# Patient Record
Sex: FEMALE | Race: AMERICAN INDIAN OR ALASKA NATIVE
[De-identification: names, ages, dates, MRNs, and addresses within clinical notes are randomized per-mention and may not be internally consistent; named-entity substitution may affect disease eponyms.]

---

## 2017-07-07 ENCOUNTER — HOSPITAL ENCOUNTER (INPATIENT)
Dept: HOSPITAL 31 - C.ER | Age: 57
LOS: 3 days | Discharge: HOME | DRG: 391 | End: 2017-07-10
Attending: INTERNAL MEDICINE | Admitting: INTERNAL MEDICINE
Payer: MEDICARE

## 2017-07-07 VITALS — BODY MASS INDEX: 25 KG/M2

## 2017-07-07 DIAGNOSIS — Z79.899: ICD-10-CM

## 2017-07-07 DIAGNOSIS — K21.9: ICD-10-CM

## 2017-07-07 DIAGNOSIS — F41.9: ICD-10-CM

## 2017-07-07 DIAGNOSIS — B20: ICD-10-CM

## 2017-07-07 DIAGNOSIS — Z99.2: ICD-10-CM

## 2017-07-07 DIAGNOSIS — E03.9: ICD-10-CM

## 2017-07-07 DIAGNOSIS — F03.90: ICD-10-CM

## 2017-07-07 DIAGNOSIS — Z87.891: ICD-10-CM

## 2017-07-07 DIAGNOSIS — D63.1: ICD-10-CM

## 2017-07-07 DIAGNOSIS — I50.22: ICD-10-CM

## 2017-07-07 DIAGNOSIS — I13.2: ICD-10-CM

## 2017-07-07 DIAGNOSIS — Z89.511: ICD-10-CM

## 2017-07-07 DIAGNOSIS — E11.22: ICD-10-CM

## 2017-07-07 DIAGNOSIS — E05.90: ICD-10-CM

## 2017-07-07 DIAGNOSIS — K30: Primary | ICD-10-CM

## 2017-07-07 DIAGNOSIS — N18.6: ICD-10-CM

## 2017-07-07 LAB
ALBUMIN SERPL-MCNC: 3.9 G/DL (ref 3.5–5)
ALBUMIN/GLOB SERPL: 0.7 {RATIO} (ref 1–2.1)
ALT SERPL-CCNC: 14 U/L (ref 9–52)
APTT BLD: 33 SECONDS (ref 21–34)
AST SERPL-CCNC: 22 U/L (ref 14–36)
BASOPHILS # BLD AUTO: 0 K/UL (ref 0–0.2)
BASOPHILS NFR BLD: 0.9 % (ref 0–2)
BUN SERPL-MCNC: 69 MG/DL (ref 7–17)
CALCIUM SERPL-MCNC: 6.7 MG/DL (ref 8.6–10.4)
EOSINOPHIL # BLD AUTO: 0 K/UL (ref 0–0.7)
EOSINOPHIL NFR BLD: 0.8 % (ref 0–4)
ERYTHROCYTE [DISTWIDTH] IN BLOOD BY AUTOMATED COUNT: 20 % (ref 11.5–14.5)
GFR NON-AFRICAN AMERICAN: 6
HGB BLD-MCNC: 11.6 G/DL (ref 11–16)
INR PPP: 0.9
LYMPHOCYTES # BLD AUTO: 1.2 K/UL (ref 1–4.3)
LYMPHOCYTES NFR BLD AUTO: 21.8 % (ref 20–40)
MCH RBC QN AUTO: 29.2 PG (ref 27–31)
MCHC RBC AUTO-ENTMCNC: 31.8 G/DL (ref 33–37)
MCV RBC AUTO: 91.8 FL (ref 81–99)
MONOCYTES # BLD: 0.6 K/UL (ref 0–0.8)
MONOCYTES NFR BLD: 9.8 % (ref 0–10)
NEUTROPHILS # BLD: 3.8 K/UL (ref 1.8–7)
NEUTROPHILS NFR BLD AUTO: 66.7 % (ref 50–75)
NRBC BLD AUTO-RTO: 0.1 % (ref 0–2)
PLATELET # BLD: 142 K/UL (ref 130–400)
PMV BLD AUTO: 8.8 FL (ref 7.2–11.7)
PROTHROMBIN TIME: 10.7 SECONDS (ref 9.7–12.2)
RBC # BLD AUTO: 3.98 MIL/UL (ref 3.8–5.2)
WBC # BLD AUTO: 5.6 K/UL (ref 4.8–10.8)

## 2017-07-07 RX ADMIN — FLUCONAZOLE SCH MLS/HR: 2 INJECTION, SOLUTION INTRAVENOUS at 22:11

## 2017-07-07 RX ADMIN — HUMAN INSULIN SCH: 100 INJECTION, SOLUTION SUBCUTANEOUS at 22:11

## 2017-07-07 RX ADMIN — SODIUM CHLORIDE SCH UNITS: 9 INJECTION, SOLUTION INTRAMUSCULAR; INTRAVENOUS; SUBCUTANEOUS at 21:25

## 2017-07-07 RX ADMIN — LOPINAVIR AND RITONAVIR SCH CAP: 200; 50 TABLET, FILM COATED ORAL at 22:09

## 2017-07-07 RX ADMIN — HUMAN INSULIN SCH: 100 INJECTION, SOLUTION SUBCUTANEOUS at 22:07

## 2017-07-07 RX ADMIN — NYSTATIN SCH ML: 100000 SUSPENSION ORAL at 22:08

## 2017-07-08 RX ADMIN — HUMAN INSULIN SCH: 100 INJECTION, SOLUTION SUBCUTANEOUS at 07:46

## 2017-07-08 RX ADMIN — HUMAN INSULIN SCH: 100 INJECTION, SOLUTION SUBCUTANEOUS at 17:59

## 2017-07-08 RX ADMIN — NYSTATIN SCH: 100000 SUSPENSION ORAL at 13:35

## 2017-07-08 RX ADMIN — LOPINAVIR AND RITONAVIR SCH: 200; 50 TABLET, FILM COATED ORAL at 10:45

## 2017-07-08 RX ADMIN — NYSTATIN SCH: 100000 SUSPENSION ORAL at 10:45

## 2017-07-08 RX ADMIN — EMTRICITABINE AND TENOFOVIR DISOPROXIL FUMARATE SCH: 200; 300 TABLET, FILM COATED ORAL at 10:46

## 2017-07-08 RX ADMIN — MAGNESIUM CITRATE SCH: 1.75 LIQUID ORAL at 10:45

## 2017-07-08 RX ADMIN — BRIMONIDINE TARTRATE SCH DROP: 2 SOLUTION/ DROPS OPHTHALMIC at 05:06

## 2017-07-08 RX ADMIN — FLUCONAZOLE SCH MLS/HR: 2 INJECTION, SOLUTION INTRAVENOUS at 17:49

## 2017-07-08 RX ADMIN — HUMAN INSULIN SCH: 100 INJECTION, SOLUTION SUBCUTANEOUS at 22:11

## 2017-07-08 RX ADMIN — HUMAN INSULIN SCH: 100 INJECTION, SOLUTION SUBCUTANEOUS at 12:06

## 2017-07-08 RX ADMIN — NYSTATIN SCH ML: 100000 SUSPENSION ORAL at 17:49

## 2017-07-08 RX ADMIN — LOPINAVIR AND RITONAVIR SCH CAP: 200; 50 TABLET, FILM COATED ORAL at 18:48

## 2017-07-08 RX ADMIN — BRIMONIDINE TARTRATE SCH DROP: 2 SOLUTION/ DROPS OPHTHALMIC at 17:47

## 2017-07-09 PROCEDURE — 5A1D00Z: ICD-10-PCS

## 2017-07-09 RX ADMIN — BRIMONIDINE TARTRATE SCH: 2 SOLUTION/ DROPS OPHTHALMIC at 18:34

## 2017-07-09 RX ADMIN — FLUCONAZOLE SCH MLS/HR: 2 INJECTION, SOLUTION INTRAVENOUS at 18:09

## 2017-07-09 RX ADMIN — NYSTATIN SCH ML: 100000 SUSPENSION ORAL at 18:11

## 2017-07-09 RX ADMIN — NYSTATIN SCH ML: 100000 SUSPENSION ORAL at 09:43

## 2017-07-09 RX ADMIN — NYSTATIN SCH ML: 100000 SUSPENSION ORAL at 13:02

## 2017-07-09 RX ADMIN — EMTRICITABINE AND TENOFOVIR DISOPROXIL FUMARATE SCH TAB: 200; 300 TABLET, FILM COATED ORAL at 09:44

## 2017-07-09 RX ADMIN — MAGNESIUM CITRATE SCH ML: 1.75 LIQUID ORAL at 09:43

## 2017-07-09 RX ADMIN — PANTOPRAZOLE SODIUM SCH MG: 40 TABLET, DELAYED RELEASE ORAL at 09:45

## 2017-07-09 RX ADMIN — BRIMONIDINE TARTRATE SCH DROP: 2 SOLUTION/ DROPS OPHTHALMIC at 18:27

## 2017-07-09 RX ADMIN — LOPINAVIR AND RITONAVIR SCH CAP: 200; 50 TABLET, FILM COATED ORAL at 09:43

## 2017-07-09 RX ADMIN — LOPINAVIR AND RITONAVIR SCH CAP: 200; 50 TABLET, FILM COATED ORAL at 18:10

## 2017-07-09 RX ADMIN — HUMAN INSULIN SCH: 100 INJECTION, SOLUTION SUBCUTANEOUS at 11:56

## 2017-07-09 RX ADMIN — HUMAN INSULIN SCH: 100 INJECTION, SOLUTION SUBCUTANEOUS at 21:26

## 2017-07-09 RX ADMIN — HUMAN INSULIN SCH: 100 INJECTION, SOLUTION SUBCUTANEOUS at 08:07

## 2017-07-09 RX ADMIN — BRIMONIDINE TARTRATE SCH DROP: 2 SOLUTION/ DROPS OPHTHALMIC at 05:11

## 2017-07-09 RX ADMIN — HUMAN INSULIN SCH: 100 INJECTION, SOLUTION SUBCUTANEOUS at 18:10

## 2017-07-10 VITALS
HEART RATE: 68 BPM | DIASTOLIC BLOOD PRESSURE: 76 MMHG | SYSTOLIC BLOOD PRESSURE: 121 MMHG | OXYGEN SATURATION: 97 % | TEMPERATURE: 97.5 F

## 2017-07-10 VITALS — RESPIRATION RATE: 20 BRPM

## 2017-07-10 LAB
ALBUMIN SERPL-MCNC: 3.5 G/DL (ref 3.5–5)
ALBUMIN/GLOB SERPL: 0.7 {RATIO} (ref 1–2.1)
ALT SERPL-CCNC: 21 U/L (ref 9–52)
AST SERPL-CCNC: 22 U/L (ref 14–36)
BASOPHILS # BLD AUTO: 0 K/UL (ref 0–0.2)
BASOPHILS NFR BLD: 1.4 % (ref 0–2)
BUN SERPL-MCNC: 67 MG/DL (ref 7–17)
CALCIUM SERPL-MCNC: 7.3 MG/DL (ref 8.6–10.4)
EOSINOPHIL # BLD AUTO: 0.1 K/UL (ref 0–0.7)
EOSINOPHIL NFR BLD: 2.4 % (ref 0–4)
ERYTHROCYTE [DISTWIDTH] IN BLOOD BY AUTOMATED COUNT: 18.9 % (ref 11.5–14.5)
GAMMA GLUTAMYL TRANSPEPTIDASE: 26 U/L (ref 8–78)
GFR NON-AFRICAN AMERICAN: 5
HEPATITIS A IGM: NEGATIVE
HEPATITIS B CORE AB: NEGATIVE
HEPATITIS B SURFACE AG: NEGATIVE
HEPATITIS C ANTIBODY: NEGATIVE
HGB BLD-MCNC: 11.1 G/DL (ref 11–16)
LYMPHOCYTES # BLD AUTO: 1.1 K/UL (ref 1–4.3)
LYMPHOCYTES NFR BLD AUTO: 33.5 % (ref 20–40)
MCH RBC QN AUTO: 29 PG (ref 27–31)
MCHC RBC AUTO-ENTMCNC: 31.7 G/DL (ref 33–37)
MCV RBC AUTO: 91.6 FL (ref 81–99)
MONOCYTES # BLD: 0.3 K/UL (ref 0–0.8)
MONOCYTES NFR BLD: 9.6 % (ref 0–10)
NEUTROPHILS # BLD: 1.8 K/UL (ref 1.8–7)
NEUTROPHILS NFR BLD AUTO: 53.1 % (ref 50–75)
NRBC BLD AUTO-RTO: 0.1 % (ref 0–2)
PLATELET # BLD: 159 K/UL (ref 130–400)
PMV BLD AUTO: 9.1 FL (ref 7.2–11.7)
RBC # BLD AUTO: 3.83 MIL/UL (ref 3.8–5.2)
WBC # BLD AUTO: 3.3 K/UL (ref 4.8–10.8)

## 2017-07-10 RX ADMIN — NYSTATIN SCH: 100000 SUSPENSION ORAL at 12:59

## 2017-07-10 RX ADMIN — BRIMONIDINE TARTRATE SCH DROP: 2 SOLUTION/ DROPS OPHTHALMIC at 05:32

## 2017-07-10 RX ADMIN — HUMAN INSULIN SCH: 100 INJECTION, SOLUTION SUBCUTANEOUS at 08:05

## 2017-07-10 RX ADMIN — NYSTATIN SCH: 100000 SUSPENSION ORAL at 09:16

## 2017-07-10 RX ADMIN — LOPINAVIR AND RITONAVIR SCH CAP: 200; 50 TABLET, FILM COATED ORAL at 19:09

## 2017-07-10 RX ADMIN — BRIMONIDINE TARTRATE SCH DROP: 2 SOLUTION/ DROPS OPHTHALMIC at 18:59

## 2017-07-10 RX ADMIN — NYSTATIN SCH ML: 100000 SUSPENSION ORAL at 19:00

## 2017-07-10 RX ADMIN — SODIUM CHLORIDE SCH UNITS: 9 INJECTION, SOLUTION INTRAMUSCULAR; INTRAVENOUS; SUBCUTANEOUS at 10:39

## 2017-07-10 RX ADMIN — FLUCONAZOLE SCH MLS/HR: 2 INJECTION, SOLUTION INTRAVENOUS at 19:00

## 2017-07-10 RX ADMIN — EMTRICITABINE AND TENOFOVIR DISOPROXIL FUMARATE SCH: 200; 300 TABLET, FILM COATED ORAL at 09:16

## 2017-07-10 RX ADMIN — MAGNESIUM CITRATE SCH: 1.75 LIQUID ORAL at 09:15

## 2017-07-10 RX ADMIN — PANTOPRAZOLE SODIUM SCH: 40 TABLET, DELAYED RELEASE ORAL at 09:16

## 2017-07-10 RX ADMIN — HUMAN INSULIN SCH: 100 INJECTION, SOLUTION SUBCUTANEOUS at 19:09

## 2017-07-10 RX ADMIN — LOPINAVIR AND RITONAVIR SCH: 200; 50 TABLET, FILM COATED ORAL at 09:16

## 2017-07-10 RX ADMIN — HUMAN INSULIN SCH: 100 INJECTION, SOLUTION SUBCUTANEOUS at 11:48

## 2017-07-12 LAB
% CD4 (T HELPER CELL): 16 PERCENT (ref 30–61)
% CD8 (SUPPRESSOR T CELL): 49 PERCENT (ref 12–42)
ABSOLUTE CD4 CELLS: 142 CELLS/MCL (ref 490–1740)
ABSOLUTE CD8 CELLS: 425 CELLS/MCL (ref 180–1170)
ABSOLUTE LYMPHOCYTES: 874 CELLS/MCL (ref 850–3900)
HELPER/SUPPRESSOR RATIO: 0.33 RATIO (ref 0.86–5)

## 2017-10-03 ENCOUNTER — HOSPITAL ENCOUNTER (EMERGENCY)
Dept: HOSPITAL 31 - C.ER | Age: 57
Discharge: HOME | End: 2017-10-03
Payer: MEDICARE

## 2017-10-03 VITALS — RESPIRATION RATE: 16 BRPM | SYSTOLIC BLOOD PRESSURE: 155 MMHG | HEART RATE: 84 BPM | DIASTOLIC BLOOD PRESSURE: 85 MMHG

## 2017-10-03 VITALS — TEMPERATURE: 98.2 F

## 2017-10-03 VITALS — BODY MASS INDEX: 25 KG/M2

## 2017-10-03 VITALS — OXYGEN SATURATION: 97 %

## 2017-10-03 DIAGNOSIS — E11.40: ICD-10-CM

## 2017-10-03 DIAGNOSIS — Z79.4: ICD-10-CM

## 2017-10-03 DIAGNOSIS — L89.629: Primary | ICD-10-CM

## 2017-10-31 ENCOUNTER — HOSPITAL ENCOUNTER (INPATIENT)
Dept: HOSPITAL 31 - C.ER | Age: 57
LOS: 9 days | Discharge: NURSING, REHAB OR LONG-TERM CARE | DRG: 969 | End: 2017-11-09
Attending: INTERNAL MEDICINE | Admitting: INTERNAL MEDICINE
Payer: MEDICARE

## 2017-10-31 VITALS — BODY MASS INDEX: 25 KG/M2

## 2017-10-31 DIAGNOSIS — L03.116: ICD-10-CM

## 2017-10-31 DIAGNOSIS — Z99.2: ICD-10-CM

## 2017-10-31 DIAGNOSIS — I70.244: ICD-10-CM

## 2017-10-31 DIAGNOSIS — D72.825: ICD-10-CM

## 2017-10-31 DIAGNOSIS — B20: Primary | ICD-10-CM

## 2017-10-31 DIAGNOSIS — D63.1: ICD-10-CM

## 2017-10-31 DIAGNOSIS — E03.9: ICD-10-CM

## 2017-10-31 DIAGNOSIS — Z89.511: ICD-10-CM

## 2017-10-31 DIAGNOSIS — E11.621: ICD-10-CM

## 2017-10-31 DIAGNOSIS — E11.22: ICD-10-CM

## 2017-10-31 DIAGNOSIS — E78.00: ICD-10-CM

## 2017-10-31 DIAGNOSIS — N18.6: ICD-10-CM

## 2017-10-31 DIAGNOSIS — F03.90: ICD-10-CM

## 2017-10-31 DIAGNOSIS — I50.9: ICD-10-CM

## 2017-10-31 DIAGNOSIS — L97.429: ICD-10-CM

## 2017-10-31 DIAGNOSIS — F41.9: ICD-10-CM

## 2017-10-31 DIAGNOSIS — I13.2: ICD-10-CM

## 2017-10-31 DIAGNOSIS — Z87.891: ICD-10-CM

## 2017-10-31 LAB
ALBUMIN/GLOB SERPL: 0.7 {RATIO} (ref 1–2.1)
ALP SERPL-CCNC: 132 U/L (ref 38–126)
ALT SERPL-CCNC: 11 U/L (ref 9–52)
AST SERPL-CCNC: 15 U/L (ref 14–36)
BASOPHILS # BLD AUTO: 0 K/UL (ref 0–0.2)
BASOPHILS NFR BLD: 0.3 % (ref 0–2)
BILIRUB SERPL-MCNC: 0.6 MG/DL (ref 0.2–1.3)
BUN SERPL-MCNC: 31 MG/DL (ref 7–17)
CALCIUM SERPL-MCNC: 8.7 MG/DL (ref 8.6–10.4)
CHLORIDE SERPL-SCNC: 95 MMOL/L (ref 98–107)
CO2 SERPL-SCNC: 31 MMOL/L (ref 22–30)
EOSINOPHIL # BLD AUTO: 0 K/UL (ref 0–0.7)
EOSINOPHIL NFR BLD: 0.3 % (ref 0–4)
ERYTHROCYTE [DISTWIDTH] IN BLOOD BY AUTOMATED COUNT: 20.9 % (ref 11.5–14.5)
GLOBULIN SER-MCNC: 6 GM/DL (ref 2.2–3.9)
GLUCOSE SERPL-MCNC: 67 MG/DL (ref 65–105)
HCT VFR BLD CALC: 33 % (ref 34–47)
LYMPHOCYTES # BLD AUTO: 0.7 K/UL (ref 1–4.3)
LYMPHOCYTES NFR BLD AUTO: 10.6 % (ref 20–40)
MCH RBC QN AUTO: 28.1 PG (ref 27–31)
MCHC RBC AUTO-ENTMCNC: 31.3 G/DL (ref 33–37)
MCV RBC AUTO: 89.6 FL (ref 81–99)
MONOCYTES # BLD: 0.5 K/UL (ref 0–0.8)
MONOCYTES NFR BLD: 7.2 % (ref 0–10)
NRBC BLD AUTO-RTO: 0.1 % (ref 0–2)
PLATELET # BLD: 219 K/UL (ref 130–400)
PMV BLD AUTO: 8.4 FL (ref 7.2–11.7)
POTASSIUM SERPL-SCNC: 3.5 MMOL/L (ref 3.6–5.2)
PROT SERPL-MCNC: 9.9 G/DL (ref 6.3–8.3)
SODIUM SERPL-SCNC: 138 MMOL/L (ref 132–148)
WBC # BLD AUTO: 6.5 K/UL (ref 4.8–10.8)

## 2017-10-31 RX ADMIN — TRAMADOL HYDROCHLORIDE PRN MG: 50 TABLET, FILM COATED ORAL at 22:17

## 2017-11-01 PROCEDURE — 5A1D70Z PERFORMANCE OF URINARY FILTRATION, INTERMITTENT, LESS THAN 6 HOURS PER DAY: ICD-10-PCS | Performed by: INTERNAL MEDICINE

## 2017-11-01 RX ADMIN — LOPINAVIR AND RITONAVIR SCH CAP: 200; 50 TABLET, FILM COATED ORAL at 10:07

## 2017-11-01 RX ADMIN — TRAMADOL HYDROCHLORIDE PRN MG: 50 TABLET, FILM COATED ORAL at 10:38

## 2017-11-01 RX ADMIN — MAGNESIUM CITRATE SCH ML: 1.75 LIQUID ORAL at 10:30

## 2017-11-01 RX ADMIN — BRIMONIDINE TARTRATE SCH DROP: 2 SOLUTION/ DROPS OPHTHALMIC at 10:04

## 2017-11-01 RX ADMIN — CEFEPIME SCH MLS/HR: 1 INJECTION, SOLUTION INTRAVENOUS at 18:12

## 2017-11-01 RX ADMIN — NYSTATIN SCH ML: 100000 SUSPENSION ORAL at 17:55

## 2017-11-01 RX ADMIN — NYSTATIN SCH ML: 100000 SUSPENSION ORAL at 10:08

## 2017-11-01 RX ADMIN — LOPINAVIR AND RITONAVIR SCH CAP: 200; 50 TABLET, FILM COATED ORAL at 18:00

## 2017-11-01 RX ADMIN — VANCOMYCIN HYDROCHLORIDE SCH MLS/HR: 1 INJECTION, POWDER, LYOPHILIZED, FOR SOLUTION INTRAVENOUS at 10:11

## 2017-11-01 RX ADMIN — PANTOPRAZOLE SODIUM SCH MG: 40 TABLET, DELAYED RELEASE ORAL at 10:09

## 2017-11-01 RX ADMIN — ERYTHROPOIETIN SCH UNIT: 10000 INJECTION, SOLUTION INTRAVENOUS; SUBCUTANEOUS at 12:32

## 2017-11-01 RX ADMIN — BRIMONIDINE TARTRATE SCH DROP: 2 SOLUTION/ DROPS OPHTHALMIC at 21:22

## 2017-11-01 RX ADMIN — COLLAGENASE SANTYL SCH APPLIC: 250 OINTMENT TOPICAL at 10:09

## 2017-11-01 RX ADMIN — COLLAGENASE SANTYL SCH APPLIC: 250 OINTMENT TOPICAL at 18:00

## 2017-11-01 RX ADMIN — SULFAMETHOXAZOLE AND TRIMETHOPRIM SCH TAB: 800; 160 TABLET ORAL at 10:06

## 2017-11-01 RX ADMIN — NYSTATIN SCH: 100000 SUSPENSION ORAL at 15:00

## 2017-11-01 RX ADMIN — COLLAGENASE SANTYL SCH APPLIC: 250 OINTMENT TOPICAL at 14:10

## 2017-11-02 LAB
ALBUMIN/GLOB SERPL: 0.6 {RATIO} (ref 1–2.1)
ALP SERPL-CCNC: 121 U/L (ref 38–126)
ALT SERPL-CCNC: 12 U/L (ref 9–52)
APTT BLD: 32 SECONDS (ref 21–34)
AST SERPL-CCNC: 18 U/L (ref 14–36)
BASOPHILS # BLD AUTO: 0 K/UL (ref 0–0.2)
BASOPHILS NFR BLD: 0.4 % (ref 0–2)
BILIRUB SERPL-MCNC: 0.6 MG/DL (ref 0.2–1.3)
BUN SERPL-MCNC: 25 MG/DL (ref 7–17)
CALCIUM SERPL-MCNC: 9.2 MG/DL (ref 8.6–10.4)
CHLORIDE SERPL-SCNC: 94 MMOL/L (ref 98–107)
CO2 SERPL-SCNC: 30 MMOL/L (ref 22–30)
EOSINOPHIL # BLD AUTO: 0.1 K/UL (ref 0–0.7)
EOSINOPHIL NFR BLD: 1 % (ref 0–4)
ERYTHROCYTE [DISTWIDTH] IN BLOOD BY AUTOMATED COUNT: 20.6 % (ref 11.5–14.5)
GLOBULIN SER-MCNC: 5.9 GM/DL (ref 2.2–3.9)
GLUCOSE SERPL-MCNC: 71 MG/DL (ref 65–105)
HCT VFR BLD CALC: 32.5 % (ref 34–47)
INR PPP: 1
LYMPHOCYTES # BLD AUTO: 0.8 K/UL (ref 1–4.3)
LYMPHOCYTES NFR BLD AUTO: 14.9 % (ref 20–40)
MCH RBC QN AUTO: 28.3 PG (ref 27–31)
MCHC RBC AUTO-ENTMCNC: 31.7 G/DL (ref 33–37)
MCV RBC AUTO: 89.3 FL (ref 81–99)
MONOCYTES # BLD: 0.4 K/UL (ref 0–0.8)
MONOCYTES NFR BLD: 8.1 % (ref 0–10)
NRBC BLD AUTO-RTO: 0 % (ref 0–2)
PLATELET # BLD: 239 K/UL (ref 130–400)
PMV BLD AUTO: 8.7 FL (ref 7.2–11.7)
POTASSIUM SERPL-SCNC: 4 MMOL/L (ref 3.6–5.2)
PROT SERPL-MCNC: 9.6 G/DL (ref 6.3–8.3)
SODIUM SERPL-SCNC: 135 MMOL/L (ref 132–148)
WBC # BLD AUTO: 5.2 K/UL (ref 4.8–10.8)

## 2017-11-02 PROCEDURE — 047L34Z DILATION OF LEFT FEMORAL ARTERY WITH DRUG-ELUTING INTRALUMINAL DEVICE, PERCUTANEOUS APPROACH: ICD-10-PCS | Performed by: SURGERY

## 2017-11-02 PROCEDURE — 04CL3ZZ EXTIRPATION OF MATTER FROM LEFT FEMORAL ARTERY, PERCUTANEOUS APPROACH: ICD-10-PCS | Performed by: SURGERY

## 2017-11-02 PROCEDURE — 047L3DZ DILATION OF LEFT FEMORAL ARTERY WITH INTRALUMINAL DEVICE, PERCUTANEOUS APPROACH: ICD-10-PCS | Performed by: SURGERY

## 2017-11-02 PROCEDURE — B41DYZZ FLUOROSCOPY OF AORTA AND BILATERAL LOWER EXTREMITY ARTERIES USING OTHER CONTRAST: ICD-10-PCS | Performed by: SURGERY

## 2017-11-02 RX ADMIN — CEFEPIME SCH MLS/HR: 1 INJECTION, SOLUTION INTRAVENOUS at 18:00

## 2017-11-02 RX ADMIN — NYSTATIN SCH ML: 100000 SUSPENSION ORAL at 18:03

## 2017-11-02 RX ADMIN — PANTOPRAZOLE SODIUM SCH: 40 TABLET, DELAYED RELEASE ORAL at 09:41

## 2017-11-02 RX ADMIN — NYSTATIN SCH: 100000 SUSPENSION ORAL at 14:07

## 2017-11-02 RX ADMIN — BRIMONIDINE TARTRATE SCH DROP: 2 SOLUTION/ DROPS OPHTHALMIC at 09:40

## 2017-11-02 RX ADMIN — COLLAGENASE SANTYL SCH: 250 OINTMENT TOPICAL at 14:08

## 2017-11-02 RX ADMIN — LOPINAVIR AND RITONAVIR SCH CAP: 200; 50 TABLET, FILM COATED ORAL at 18:00

## 2017-11-02 RX ADMIN — COLLAGENASE SANTYL SCH APPLIC: 250 OINTMENT TOPICAL at 09:43

## 2017-11-02 RX ADMIN — SODIUM CHLORIDE SCH MLS/HR: 0.9 INJECTION, SOLUTION INTRAVENOUS at 21:14

## 2017-11-02 RX ADMIN — SODIUM HYPOCHLORITE SCH APPLIC: 2.5 SOLUTION TOPICAL at 09:44

## 2017-11-02 RX ADMIN — BRIMONIDINE TARTRATE SCH DROP: 2 SOLUTION/ DROPS OPHTHALMIC at 21:11

## 2017-11-02 RX ADMIN — NYSTATIN SCH: 100000 SUSPENSION ORAL at 09:40

## 2017-11-02 RX ADMIN — LOPINAVIR AND RITONAVIR SCH: 200; 50 TABLET, FILM COATED ORAL at 09:40

## 2017-11-02 RX ADMIN — COLLAGENASE SANTYL SCH APPLIC: 250 OINTMENT TOPICAL at 18:05

## 2017-11-02 RX ADMIN — MAGNESIUM CITRATE SCH: 1.75 LIQUID ORAL at 09:45

## 2017-11-02 RX ADMIN — COLLAGENASE SANTYL SCH: 250 OINTMENT TOPICAL at 18:00

## 2017-11-03 LAB
ALBUMIN/GLOB SERPL: 0.7 {RATIO} (ref 1–2.1)
ALP SERPL-CCNC: 107 U/L (ref 38–126)
ALT SERPL-CCNC: 18 U/L (ref 9–52)
AST SERPL-CCNC: 20 U/L (ref 14–36)
BASOPHILS # BLD AUTO: 0 K/UL (ref 0–0.2)
BASOPHILS NFR BLD: 0.6 % (ref 0–2)
BILIRUB SERPL-MCNC: 0.9 MG/DL (ref 0.2–1.3)
BUN SERPL-MCNC: 38 MG/DL (ref 7–17)
CALCIUM SERPL-MCNC: 8 MG/DL (ref 8.6–10.4)
CHLORIDE SERPL-SCNC: 93 MMOL/L (ref 98–107)
CO2 SERPL-SCNC: 26 MMOL/L (ref 22–30)
EOSINOPHIL # BLD AUTO: 0.1 K/UL (ref 0–0.7)
EOSINOPHIL NFR BLD: 1.1 % (ref 0–4)
EOSINOPHIL NFR BLD: 2 % (ref 0–4)
ERYTHROCYTE [DISTWIDTH] IN BLOOD BY AUTOMATED COUNT: 20.9 % (ref 11.5–14.5)
GLOBULIN SER-MCNC: 4.5 GM/DL (ref 2.2–3.9)
GLUCOSE SERPL-MCNC: 93 MG/DL (ref 65–105)
HCT VFR BLD CALC: 30.8 % (ref 34–47)
LABORATORY COMMENT REPORT: (no result)
LYMPHOCYTES # BLD AUTO: 0.7 K/UL (ref 1–4.3)
LYMPHOCYTES NFR BLD AUTO: 9.9 % (ref 20–40)
MCH RBC QN AUTO: 28 PG (ref 27–31)
MCHC RBC AUTO-ENTMCNC: 31.6 G/DL (ref 33–37)
MCV RBC AUTO: 88.7 FL (ref 81–99)
MONOCYTES # BLD: 0.5 K/UL (ref 0–0.8)
MONOCYTES NFR BLD: 7.5 % (ref 0–10)
NEUTROPHILS NFR BLD AUTO: 78 % (ref 50–75)
NRBC BLD AUTO-RTO: 0.1 % (ref 0–2)
PLATELET # BLD: 216 K/UL (ref 130–400)
PMV BLD AUTO: 8.3 FL (ref 7.2–11.7)
POTASSIUM SERPL-SCNC: 5.9 MMOL/L (ref 3.6–5.2)
PROT SERPL-MCNC: 7.8 G/DL (ref 6.3–8.3)
SODIUM SERPL-SCNC: 131 MMOL/L (ref 132–148)
TOTAL CELLS COUNTED BLD: 100
WBC # BLD AUTO: 6.8 K/UL (ref 4.8–10.8)

## 2017-11-03 RX ADMIN — TRAMADOL HYDROCHLORIDE PRN MG: 50 TABLET, FILM COATED ORAL at 12:35

## 2017-11-03 RX ADMIN — LOPINAVIR AND RITONAVIR SCH: 200; 50 TABLET, FILM COATED ORAL at 11:00

## 2017-11-03 RX ADMIN — LOPINAVIR AND RITONAVIR SCH CAP: 200; 50 TABLET, FILM COATED ORAL at 18:15

## 2017-11-03 RX ADMIN — SODIUM CHLORIDE SCH UNITS: 9 INJECTION, SOLUTION INTRAMUSCULAR; INTRAVENOUS; SUBCUTANEOUS at 13:14

## 2017-11-03 RX ADMIN — SODIUM CHLORIDE SCH MLS/HR: 0.9 INJECTION, SOLUTION INTRAVENOUS at 21:29

## 2017-11-03 RX ADMIN — COLLAGENASE SANTYL SCH: 250 OINTMENT TOPICAL at 10:59

## 2017-11-03 RX ADMIN — NYSTATIN SCH: 100000 SUSPENSION ORAL at 11:00

## 2017-11-03 RX ADMIN — TRAMADOL HYDROCHLORIDE PRN MG: 50 TABLET, FILM COATED ORAL at 04:09

## 2017-11-03 RX ADMIN — ERYTHROPOIETIN SCH UNIT: 10000 INJECTION, SOLUTION INTRAVENOUS; SUBCUTANEOUS at 12:02

## 2017-11-03 RX ADMIN — SODIUM HYPOCHLORITE SCH: 2.5 SOLUTION TOPICAL at 10:56

## 2017-11-03 RX ADMIN — NYSTATIN SCH ML: 100000 SUSPENSION ORAL at 18:15

## 2017-11-03 RX ADMIN — NYSTATIN SCH: 100000 SUSPENSION ORAL at 14:01

## 2017-11-03 RX ADMIN — NYSTATIN SCH ML: 100000 SUSPENSION ORAL at 13:51

## 2017-11-03 RX ADMIN — COLLAGENASE SANTYL SCH: 250 OINTMENT TOPICAL at 13:59

## 2017-11-03 RX ADMIN — SODIUM CHLORIDE SCH UNITS: 9 INJECTION, SOLUTION INTRAMUSCULAR; INTRAVENOUS; SUBCUTANEOUS at 10:31

## 2017-11-03 RX ADMIN — BRIMONIDINE TARTRATE SCH: 2 SOLUTION/ DROPS OPHTHALMIC at 10:54

## 2017-11-03 RX ADMIN — SULFAMETHOXAZOLE AND TRIMETHOPRIM SCH: 800; 160 TABLET ORAL at 10:54

## 2017-11-03 RX ADMIN — COLLAGENASE SANTYL SCH APPLIC: 250 OINTMENT TOPICAL at 18:17

## 2017-11-03 RX ADMIN — SODIUM CHLORIDE SCH: 0.9 INJECTION, SOLUTION INTRAVENOUS at 11:00

## 2017-11-03 RX ADMIN — COLLAGENASE SANTYL SCH APPLIC: 250 OINTMENT TOPICAL at 13:55

## 2017-11-03 RX ADMIN — VANCOMYCIN HYDROCHLORIDE SCH: 1 INJECTION, POWDER, LYOPHILIZED, FOR SOLUTION INTRAVENOUS at 10:59

## 2017-11-03 RX ADMIN — MAGNESIUM CITRATE SCH: 1.75 LIQUID ORAL at 10:55

## 2017-11-03 RX ADMIN — PANTOPRAZOLE SODIUM SCH MG: 40 TABLET, DELAYED RELEASE ORAL at 10:47

## 2017-11-03 RX ADMIN — BRIMONIDINE TARTRATE SCH DROP: 2 SOLUTION/ DROPS OPHTHALMIC at 21:33

## 2017-11-03 RX ADMIN — SODIUM CHLORIDE SCH: 9 INJECTION, SOLUTION INTRAMUSCULAR; INTRAVENOUS; SUBCUTANEOUS at 12:47

## 2017-11-04 RX ADMIN — TRAMADOL HYDROCHLORIDE PRN MG: 50 TABLET, FILM COATED ORAL at 10:03

## 2017-11-04 RX ADMIN — LOPINAVIR AND RITONAVIR SCH CAP: 200; 50 TABLET, FILM COATED ORAL at 09:55

## 2017-11-04 RX ADMIN — PANTOPRAZOLE SODIUM SCH MG: 40 TABLET, DELAYED RELEASE ORAL at 09:55

## 2017-11-04 RX ADMIN — COLLAGENASE SANTYL SCH APPLIC: 250 OINTMENT TOPICAL at 09:58

## 2017-11-04 RX ADMIN — COLLAGENASE SANTYL SCH: 250 OINTMENT TOPICAL at 14:49

## 2017-11-04 RX ADMIN — BRIMONIDINE TARTRATE SCH DROP: 2 SOLUTION/ DROPS OPHTHALMIC at 09:59

## 2017-11-04 RX ADMIN — NYSTATIN SCH: 100000 SUSPENSION ORAL at 17:40

## 2017-11-04 RX ADMIN — BRIMONIDINE TARTRATE SCH DROP: 2 SOLUTION/ DROPS OPHTHALMIC at 21:44

## 2017-11-04 RX ADMIN — MAGNESIUM CITRATE SCH ML: 1.75 LIQUID ORAL at 09:56

## 2017-11-04 RX ADMIN — SODIUM CHLORIDE SCH MLS/HR: 0.9 INJECTION, SOLUTION INTRAVENOUS at 09:52

## 2017-11-04 RX ADMIN — LOPINAVIR AND RITONAVIR SCH CAP: 200; 50 TABLET, FILM COATED ORAL at 17:33

## 2017-11-04 RX ADMIN — COLLAGENASE SANTYL SCH APPLIC: 250 OINTMENT TOPICAL at 17:39

## 2017-11-04 RX ADMIN — SODIUM CHLORIDE SCH MLS/HR: 0.9 INJECTION, SOLUTION INTRAVENOUS at 21:43

## 2017-11-04 RX ADMIN — SODIUM HYPOCHLORITE SCH APPLIC: 2.5 SOLUTION TOPICAL at 09:59

## 2017-11-04 RX ADMIN — NYSTATIN SCH: 100000 SUSPENSION ORAL at 14:49

## 2017-11-04 RX ADMIN — NYSTATIN SCH ML: 100000 SUSPENSION ORAL at 09:53

## 2017-11-05 RX ADMIN — PANTOPRAZOLE SODIUM SCH MG: 40 TABLET, DELAYED RELEASE ORAL at 10:17

## 2017-11-05 RX ADMIN — LOPINAVIR AND RITONAVIR SCH CAP: 200; 50 TABLET, FILM COATED ORAL at 17:17

## 2017-11-05 RX ADMIN — SODIUM HYPOCHLORITE SCH APPLIC: 2.5 SOLUTION TOPICAL at 10:58

## 2017-11-05 RX ADMIN — SODIUM CHLORIDE SCH MLS/HR: 0.9 INJECTION, SOLUTION INTRAVENOUS at 21:48

## 2017-11-05 RX ADMIN — SODIUM CHLORIDE SCH MLS/HR: 0.9 INJECTION, SOLUTION INTRAVENOUS at 10:15

## 2017-11-05 RX ADMIN — NYSTATIN SCH: 100000 SUSPENSION ORAL at 17:14

## 2017-11-05 RX ADMIN — COLLAGENASE SANTYL SCH: 250 OINTMENT TOPICAL at 17:14

## 2017-11-05 RX ADMIN — NYSTATIN SCH ML: 100000 SUSPENSION ORAL at 10:16

## 2017-11-05 RX ADMIN — BRIMONIDINE TARTRATE SCH DROP: 2 SOLUTION/ DROPS OPHTHALMIC at 21:49

## 2017-11-05 RX ADMIN — MAGNESIUM CITRATE SCH ML: 1.75 LIQUID ORAL at 10:18

## 2017-11-05 RX ADMIN — TRAMADOL HYDROCHLORIDE PRN MG: 50 TABLET, FILM COATED ORAL at 10:17

## 2017-11-05 RX ADMIN — BRIMONIDINE TARTRATE SCH DROP: 2 SOLUTION/ DROPS OPHTHALMIC at 10:58

## 2017-11-05 RX ADMIN — LOPINAVIR AND RITONAVIR SCH CAP: 200; 50 TABLET, FILM COATED ORAL at 10:21

## 2017-11-05 RX ADMIN — COLLAGENASE SANTYL SCH APPLIC: 250 OINTMENT TOPICAL at 10:57

## 2017-11-06 LAB
ALBUMIN/GLOB SERPL: 0.6 {RATIO} (ref 1–2.1)
ALP SERPL-CCNC: 105 U/L (ref 38–126)
ALT SERPL-CCNC: 9 U/L (ref 9–52)
AST SERPL-CCNC: 29 U/L (ref 14–36)
BILIRUB SERPL-MCNC: 0.5 MG/DL (ref 0.2–1.3)
BUN SERPL-MCNC: 40 MG/DL (ref 7–17)
CALCIUM SERPL-MCNC: 9.1 MG/DL (ref 8.6–10.4)
CHLORIDE SERPL-SCNC: 94 MMOL/L (ref 98–107)
CO2 SERPL-SCNC: 23 MMOL/L (ref 22–30)
ERYTHROCYTE [DISTWIDTH] IN BLOOD BY AUTOMATED COUNT: 20.9 % (ref 11.5–14.5)
GLOBULIN SER-MCNC: 5.4 GM/DL (ref 2.2–3.9)
GLUCOSE SERPL-MCNC: 56 MG/DL (ref 65–105)
HCT VFR BLD CALC: 30.2 % (ref 34–47)
MCH RBC QN AUTO: 28.1 PG (ref 27–31)
MCHC RBC AUTO-ENTMCNC: 31.7 G/DL (ref 33–37)
MCV RBC AUTO: 88.9 FL (ref 81–99)
PLATELET # BLD: 275 K/UL (ref 130–400)
PMV BLD AUTO: 8.3 FL (ref 7.2–11.7)
POTASSIUM SERPL-SCNC: 4.7 MMOL/L (ref 3.6–5.2)
PROT SERPL-MCNC: 8.7 G/DL (ref 6.3–8.3)
SODIUM SERPL-SCNC: 132 MMOL/L (ref 132–148)
WBC # BLD AUTO: 5.2 K/UL (ref 4.8–10.8)

## 2017-11-06 RX ADMIN — MAGNESIUM CITRATE SCH ML: 1.75 LIQUID ORAL at 14:56

## 2017-11-06 RX ADMIN — BRIMONIDINE TARTRATE SCH DROP: 2 SOLUTION/ DROPS OPHTHALMIC at 21:53

## 2017-11-06 RX ADMIN — SULFAMETHOXAZOLE AND TRIMETHOPRIM SCH: 800; 160 TABLET ORAL at 10:00

## 2017-11-06 RX ADMIN — COLLAGENASE SANTYL SCH APPLIC: 250 OINTMENT TOPICAL at 18:50

## 2017-11-06 RX ADMIN — COLLAGENASE SANTYL SCH: 250 OINTMENT TOPICAL at 14:59

## 2017-11-06 RX ADMIN — NYSTATIN SCH: 100000 SUSPENSION ORAL at 10:28

## 2017-11-06 RX ADMIN — VANCOMYCIN HYDROCHLORIDE SCH: 1 INJECTION, POWDER, LYOPHILIZED, FOR SOLUTION INTRAVENOUS at 10:00

## 2017-11-06 RX ADMIN — BRIMONIDINE TARTRATE SCH DROP: 2 SOLUTION/ DROPS OPHTHALMIC at 10:02

## 2017-11-06 RX ADMIN — ERYTHROPOIETIN SCH UNIT: 10000 INJECTION, SOLUTION INTRAVENOUS; SUBCUTANEOUS at 13:21

## 2017-11-06 RX ADMIN — SODIUM CHLORIDE SCH: 0.9 INJECTION, SOLUTION INTRAVENOUS at 10:27

## 2017-11-06 RX ADMIN — MAGNESIUM CITRATE SCH: 1.75 LIQUID ORAL at 10:24

## 2017-11-06 RX ADMIN — LOPINAVIR AND RITONAVIR SCH: 200; 50 TABLET, FILM COATED ORAL at 10:26

## 2017-11-06 RX ADMIN — SODIUM CHLORIDE SCH MLS/HR: 0.9 INJECTION, SOLUTION INTRAVENOUS at 16:50

## 2017-11-06 RX ADMIN — SODIUM HYPOCHLORITE SCH: 2.5 SOLUTION TOPICAL at 10:24

## 2017-11-06 RX ADMIN — PANTOPRAZOLE SODIUM SCH MG: 40 TABLET, DELAYED RELEASE ORAL at 14:55

## 2017-11-06 RX ADMIN — SULFAMETHOXAZOLE AND TRIMETHOPRIM SCH TAB: 800; 160 TABLET ORAL at 14:54

## 2017-11-06 RX ADMIN — COLLAGENASE SANTYL SCH: 250 OINTMENT TOPICAL at 10:31

## 2017-11-06 RX ADMIN — SODIUM CHLORIDE SCH UNITS: 9 INJECTION, SOLUTION INTRAMUSCULAR; INTRAVENOUS; SUBCUTANEOUS at 13:20

## 2017-11-06 RX ADMIN — NYSTATIN SCH: 100000 SUSPENSION ORAL at 18:50

## 2017-11-06 RX ADMIN — NYSTATIN SCH ML: 100000 SUSPENSION ORAL at 15:00

## 2017-11-06 RX ADMIN — SODIUM CHLORIDE SCH UNITS: 9 INJECTION, SOLUTION INTRAMUSCULAR; INTRAVENOUS; SUBCUTANEOUS at 10:58

## 2017-11-06 RX ADMIN — VANCOMYCIN HYDROCHLORIDE SCH MLS/HR: 1 INJECTION, POWDER, LYOPHILIZED, FOR SOLUTION INTRAVENOUS at 14:51

## 2017-11-06 RX ADMIN — LOPINAVIR AND RITONAVIR SCH CAP: 200; 50 TABLET, FILM COATED ORAL at 17:49

## 2017-11-06 RX ADMIN — PANTOPRAZOLE SODIUM SCH: 40 TABLET, DELAYED RELEASE ORAL at 10:29

## 2017-11-07 PROCEDURE — 05H333Z INSERTION OF INFUSION DEVICE INTO RIGHT INNOMINATE VEIN, PERCUTANEOUS APPROACH: ICD-10-PCS | Performed by: RADIOLOGY

## 2017-11-07 PROCEDURE — 0HBNXZZ EXCISION OF LEFT FOOT SKIN, EXTERNAL APPROACH: ICD-10-PCS | Performed by: PODIATRIST

## 2017-11-07 RX ADMIN — SODIUM CHLORIDE SCH MLS/HR: 0.9 INJECTION, SOLUTION INTRAVENOUS at 16:51

## 2017-11-07 RX ADMIN — MAGNESIUM CITRATE SCH: 1.75 LIQUID ORAL at 09:06

## 2017-11-07 RX ADMIN — NYSTATIN SCH: 100000 SUSPENSION ORAL at 09:06

## 2017-11-07 RX ADMIN — LOPINAVIR AND RITONAVIR SCH: 200; 50 TABLET, FILM COATED ORAL at 09:06

## 2017-11-07 RX ADMIN — COLLAGENASE SANTYL SCH: 250 OINTMENT TOPICAL at 13:30

## 2017-11-07 RX ADMIN — LOPINAVIR AND RITONAVIR SCH CAP: 200; 50 TABLET, FILM COATED ORAL at 17:33

## 2017-11-07 RX ADMIN — COLLAGENASE SANTYL SCH: 250 OINTMENT TOPICAL at 09:07

## 2017-11-07 RX ADMIN — NYSTATIN SCH ML: 100000 SUSPENSION ORAL at 17:33

## 2017-11-07 RX ADMIN — BRIMONIDINE TARTRATE SCH DROP: 2 SOLUTION/ DROPS OPHTHALMIC at 21:56

## 2017-11-07 RX ADMIN — SODIUM CHLORIDE SCH MLS/HR: 0.9 INJECTION, SOLUTION INTRAVENOUS at 04:34

## 2017-11-07 RX ADMIN — SODIUM HYPOCHLORITE SCH: 2.5 SOLUTION TOPICAL at 09:06

## 2017-11-07 RX ADMIN — PANTOPRAZOLE SODIUM SCH: 40 TABLET, DELAYED RELEASE ORAL at 09:07

## 2017-11-07 RX ADMIN — COLLAGENASE SANTYL SCH: 250 OINTMENT TOPICAL at 17:36

## 2017-11-07 RX ADMIN — NYSTATIN SCH: 100000 SUSPENSION ORAL at 13:30

## 2017-11-07 RX ADMIN — BRIMONIDINE TARTRATE SCH: 2 SOLUTION/ DROPS OPHTHALMIC at 09:06

## 2017-11-08 VITALS — RESPIRATION RATE: 20 BRPM

## 2017-11-08 RX ADMIN — SODIUM CHLORIDE SCH MLS/HR: 0.9 INJECTION, SOLUTION INTRAVENOUS at 04:00

## 2017-11-08 RX ADMIN — PANTOPRAZOLE SODIUM SCH: 40 TABLET, DELAYED RELEASE ORAL at 11:59

## 2017-11-08 RX ADMIN — NYSTATIN SCH: 100000 SUSPENSION ORAL at 11:59

## 2017-11-08 RX ADMIN — ERYTHROPOIETIN SCH UNIT: 10000 INJECTION, SOLUTION INTRAVENOUS; SUBCUTANEOUS at 11:13

## 2017-11-08 RX ADMIN — MAGNESIUM CITRATE SCH ML: 1.75 LIQUID ORAL at 08:41

## 2017-11-08 RX ADMIN — NYSTATIN SCH ML: 100000 SUSPENSION ORAL at 18:40

## 2017-11-08 RX ADMIN — LOPINAVIR AND RITONAVIR SCH CAP: 200; 50 TABLET, FILM COATED ORAL at 08:42

## 2017-11-08 RX ADMIN — COLLAGENASE SANTYL SCH APPLIC: 250 OINTMENT TOPICAL at 13:29

## 2017-11-08 RX ADMIN — MAGNESIUM CITRATE SCH: 1.75 LIQUID ORAL at 11:56

## 2017-11-08 RX ADMIN — COLLAGENASE SANTYL SCH: 250 OINTMENT TOPICAL at 12:00

## 2017-11-08 RX ADMIN — SODIUM HYPOCHLORITE SCH: 2.5 SOLUTION TOPICAL at 11:57

## 2017-11-08 RX ADMIN — PANTOPRAZOLE SODIUM SCH MG: 40 TABLET, DELAYED RELEASE ORAL at 08:44

## 2017-11-08 RX ADMIN — LOPINAVIR AND RITONAVIR SCH CAP: 200; 50 TABLET, FILM COATED ORAL at 18:40

## 2017-11-08 RX ADMIN — NYSTATIN SCH ML: 100000 SUSPENSION ORAL at 13:28

## 2017-11-08 RX ADMIN — BRIMONIDINE TARTRATE SCH: 2 SOLUTION/ DROPS OPHTHALMIC at 11:56

## 2017-11-08 RX ADMIN — BRIMONIDINE TARTRATE SCH DROP: 2 SOLUTION/ DROPS OPHTHALMIC at 21:32

## 2017-11-08 RX ADMIN — SODIUM CHLORIDE SCH MLS/HR: 0.9 INJECTION, SOLUTION INTRAVENOUS at 16:00

## 2017-11-08 RX ADMIN — SULFAMETHOXAZOLE AND TRIMETHOPRIM SCH TAB: 800; 160 TABLET ORAL at 08:44

## 2017-11-08 RX ADMIN — LOPINAVIR AND RITONAVIR SCH: 200; 50 TABLET, FILM COATED ORAL at 11:58

## 2017-11-08 RX ADMIN — COLLAGENASE SANTYL SCH: 250 OINTMENT TOPICAL at 18:42

## 2017-11-09 VITALS
OXYGEN SATURATION: 98 % | DIASTOLIC BLOOD PRESSURE: 66 MMHG | SYSTOLIC BLOOD PRESSURE: 101 MMHG | TEMPERATURE: 97.9 F | HEART RATE: 63 BPM

## 2017-11-09 LAB
ALBUMIN/GLOB SERPL: 0.8 {RATIO} (ref 1–2.1)
ALP SERPL-CCNC: 116 U/L (ref 38–126)
ALT SERPL-CCNC: 14 U/L (ref 9–52)
AST SERPL-CCNC: 21 U/L (ref 14–36)
BASOPHILS # BLD AUTO: 0.1 K/UL (ref 0–0.2)
BASOPHILS NFR BLD: 1.1 % (ref 0–2)
BILIRUB SERPL-MCNC: 0.8 MG/DL (ref 0.2–1.3)
BUN SERPL-MCNC: 23 MG/DL (ref 7–17)
CALCIUM SERPL-MCNC: 7.9 MG/DL (ref 8.6–10.4)
CHLORIDE SERPL-SCNC: 95 MMOL/L (ref 98–107)
CO2 SERPL-SCNC: 27 MMOL/L (ref 22–30)
EOSINOPHIL # BLD AUTO: 0.1 K/UL (ref 0–0.7)
EOSINOPHIL NFR BLD: 2.4 % (ref 0–4)
ERYTHROCYTE [DISTWIDTH] IN BLOOD BY AUTOMATED COUNT: 21.1 % (ref 11.5–14.5)
GLOBULIN SER-MCNC: 4.6 GM/DL (ref 2.2–3.9)
GLUCOSE SERPL-MCNC: 82 MG/DL (ref 65–105)
HCT VFR BLD CALC: 33.8 % (ref 34–47)
LYMPHOCYTES # BLD AUTO: 1.2 K/UL (ref 1–4.3)
LYMPHOCYTES NFR BLD AUTO: 26.3 % (ref 20–40)
MCH RBC QN AUTO: 28 PG (ref 27–31)
MCHC RBC AUTO-ENTMCNC: 30.9 G/DL (ref 33–37)
MCV RBC AUTO: 90.4 FL (ref 81–99)
MONOCYTES # BLD: 0.6 K/UL (ref 0–0.8)
MONOCYTES NFR BLD: 13.5 % (ref 0–10)
NRBC BLD AUTO-RTO: 0.5 % (ref 0–2)
PLATELET # BLD: 279 K/UL (ref 130–400)
PMV BLD AUTO: 8.1 FL (ref 7.2–11.7)
POTASSIUM SERPL-SCNC: 4.5 MMOL/L (ref 3.6–5.2)
PROT SERPL-MCNC: 8.1 G/DL (ref 6.3–8.3)
SODIUM SERPL-SCNC: 133 MMOL/L (ref 132–148)
WBC # BLD AUTO: 4.6 K/UL (ref 4.8–10.8)

## 2017-11-09 RX ADMIN — BRIMONIDINE TARTRATE SCH DROP: 2 SOLUTION/ DROPS OPHTHALMIC at 09:54

## 2017-11-09 RX ADMIN — NYSTATIN SCH: 100000 SUSPENSION ORAL at 13:57

## 2017-11-09 RX ADMIN — PANTOPRAZOLE SODIUM SCH MG: 40 TABLET, DELAYED RELEASE ORAL at 09:43

## 2017-11-09 RX ADMIN — NYSTATIN SCH ML: 100000 SUSPENSION ORAL at 17:38

## 2017-11-09 RX ADMIN — NYSTATIN SCH ML: 100000 SUSPENSION ORAL at 09:44

## 2017-11-09 RX ADMIN — LOPINAVIR AND RITONAVIR SCH CAP: 200; 50 TABLET, FILM COATED ORAL at 17:46

## 2017-11-09 RX ADMIN — MAGNESIUM CITRATE SCH: 1.75 LIQUID ORAL at 09:55

## 2017-11-09 RX ADMIN — COLLAGENASE SANTYL SCH: 250 OINTMENT TOPICAL at 13:55

## 2017-11-09 RX ADMIN — SODIUM CHLORIDE SCH MLS/HR: 0.9 INJECTION, SOLUTION INTRAVENOUS at 04:18

## 2017-11-09 RX ADMIN — SODIUM CHLORIDE SCH MLS/HR: 0.9 INJECTION, SOLUTION INTRAVENOUS at 17:41

## 2017-11-09 RX ADMIN — COLLAGENASE SANTYL SCH APPLIC: 250 OINTMENT TOPICAL at 09:53

## 2017-11-09 RX ADMIN — LOPINAVIR AND RITONAVIR SCH CAP: 200; 50 TABLET, FILM COATED ORAL at 09:47

## 2017-11-09 RX ADMIN — SODIUM HYPOCHLORITE SCH APPLIC: 2.5 SOLUTION TOPICAL at 09:52

## 2017-11-21 ENCOUNTER — HOSPITAL ENCOUNTER (OUTPATIENT)
Dept: HOSPITAL 42 - SDSVAS | Age: 57
Discharge: HOME | End: 2017-11-21
Attending: RADIOLOGY
Payer: MEDICARE

## 2017-11-21 VITALS
SYSTOLIC BLOOD PRESSURE: 136 MMHG | RESPIRATION RATE: 18 BRPM | OXYGEN SATURATION: 96 % | HEART RATE: 65 BPM | DIASTOLIC BLOOD PRESSURE: 51 MMHG | TEMPERATURE: 97.9 F

## 2017-11-21 VITALS — BODY MASS INDEX: 28.3 KG/M2

## 2017-11-21 DIAGNOSIS — T82.868A: Primary | ICD-10-CM
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
## 2017-11-21 DIAGNOSIS — N18.6: ICD-10-CM

## 2017-11-21 LAB
APTT BLD: 27.4 SECONDS (ref 25.1–36.5)
BASOPHILS # BLD AUTO: 0.03 K/MM3 (ref 0–2)
BASOPHILS NFR BLD: 0.7 % (ref 0–3)
BUN SERPL-MCNC: 26 MG/DL (ref 7–21)
CALCIUM SERPL-MCNC: 9.8 MG/DL (ref 8.4–10.5)
CHLORIDE SERPL-SCNC: 100 MMOL/L (ref 98–107)
CO2 SERPL-SCNC: 33 MMOL/L (ref 21–33)
EOSINOPHIL # BLD: 0.4 10*3/UL (ref 0–0.7)
EOSINOPHIL NFR BLD: 7.9 % (ref 1.5–5)
ERYTHROCYTE [DISTWIDTH] IN BLOOD BY AUTOMATED COUNT: 23.2 % (ref 11.5–14.5)
GLUCOSE SERPL-MCNC: 81 MG/DL (ref 70–110)
GRANULOCYTES # BLD: 2.44 10*3/UL (ref 1.4–6.5)
GRANULOCYTES NFR BLD: 55.4 % (ref 50–68)
HCT VFR BLD CALC: 38 % (ref 36–48)
INR PPP: 1.01 (ref 0.93–1.08)
LYMPHOCYTES # BLD: 1.1 10*3/UL (ref 1.2–3.4)
LYMPHOCYTES NFR BLD AUTO: 24.7 % (ref 22–35)
MCH RBC QN AUTO: 29.5 PG (ref 25–35)
MCHC RBC AUTO-ENTMCNC: 29.7 G/DL (ref 31–37)
MCV RBC AUTO: 99.2 FL (ref 80–105)
MONOCYTES # BLD AUTO: 0.5 10*3/UL (ref 0.1–0.6)
MONOCYTES NFR BLD: 11.3 % (ref 1–6)
PLATELET # BLD: 239 10^3/UL (ref 120–450)
PMV BLD AUTO: 10 FL (ref 7–11)
POTASSIUM SERPL-SCNC: 4.9 MMOL/L (ref 3.6–5)
SODIUM SERPL-SCNC: 141 MMOL/L (ref 132–148)
WBC # BLD AUTO: 4.4 10^3/UL (ref 4.5–11)

## 2017-11-21 NOTE — VASCULAR
PROCEDURE:  Replace tunneled trans lumbar dialysis catheter 



CLINICAL HISTORY:  End stage renal disease. Malfunctioning tunneled 

dialysis catheter.



PHYSICIAN(S):  Kodi Watters M.D.



TECHNIQUE:

The relative risks and indications for the procedure were explained 

to the patient and consent obtained. The patient was placed prone on 

the arteriogram table and the tunneled trans lumbar dialysis catheter 

prepped and draped in the usual sterile fashion. Antibiotics were 

given prior to the procedure. 1% Xylocaine was used to anesthetize 

the skin soft tissues at the vein insertion site. A 2 cm incision was 

performed and the catheter bluntly dissected. Both ends of the 

catheter controlled and the catheter transected. The cuff and soft 

tissue portion of the catheter were anesthetized with 1% Xylocaine. 

Blunt dissection was performed. The cuffed portion of the catheter 

was removed.



A 0.035 angled Glidewire was advanced through the catheter fragment 

and advanced to the right atrium. The old catheter was removed. A new 

35 cm dialysis catheter was advanced to the hepatic segment of the 

IVC. The catheter was tunneled. Unfortunately the catheter would not 

inject or aspirate easily. 



Subsequently a venogram was performed through the dialysis catheter. 

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 



0.035 wire was advanced through the catheter. 35 cm catheter was 

removed. A new 55 cm tunneled dialysis catheter was advanced with its 

tip in the right atrium. Catheter was tunneled around the right 

flank. The catheter was flushed and secured.  The patient tolerated 

the procedure well. 



FINDINGS:

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 The tunnel 

dialysis catheter had to be advanced into the right atrium. 



IMPRESSION:

1. Replacement of the patient's tunneled trans lumbar dialysis 

catheter. 



2. 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 The dialysis 

catheter was advanced into the right atrium.

## 2017-11-22 ENCOUNTER — HOSPITAL ENCOUNTER (INPATIENT)
Dept: HOSPITAL 42 - ED | Age: 57
LOS: 3 days | Discharge: NURSING, REHAB OR LONG-TERM CARE | DRG: 314 | End: 2017-11-25
Attending: INTERNAL MEDICINE | Admitting: HOSPITALIST
Payer: MEDICARE

## 2017-11-22 VITALS — BODY MASS INDEX: 25.4 KG/M2

## 2017-11-22 DIAGNOSIS — Z87.891: ICD-10-CM

## 2017-11-22 DIAGNOSIS — I12.9: ICD-10-CM

## 2017-11-22 DIAGNOSIS — B20: ICD-10-CM

## 2017-11-22 DIAGNOSIS — N18.6: ICD-10-CM

## 2017-11-22 DIAGNOSIS — E83.39: ICD-10-CM

## 2017-11-22 DIAGNOSIS — D64.9: ICD-10-CM

## 2017-11-22 DIAGNOSIS — H40.9: ICD-10-CM

## 2017-11-22 DIAGNOSIS — N25.81: ICD-10-CM

## 2017-11-22 DIAGNOSIS — E78.5: ICD-10-CM

## 2017-11-22 DIAGNOSIS — T82.510A: Primary | ICD-10-CM

## 2017-11-22 DIAGNOSIS — E11.621: ICD-10-CM

## 2017-11-22 DIAGNOSIS — F03.90: ICD-10-CM

## 2017-11-22 DIAGNOSIS — E11.51: ICD-10-CM

## 2017-11-22 DIAGNOSIS — E11.22: ICD-10-CM

## 2017-11-22 DIAGNOSIS — E87.5: ICD-10-CM

## 2017-11-22 DIAGNOSIS — Z99.2: ICD-10-CM

## 2017-11-22 DIAGNOSIS — E03.9: ICD-10-CM

## 2017-11-22 DIAGNOSIS — L97.429: ICD-10-CM

## 2017-11-22 DIAGNOSIS — E11.649: ICD-10-CM

## 2017-11-22 DIAGNOSIS — Z79.4: ICD-10-CM

## 2017-11-22 DIAGNOSIS — Z89.511: ICD-10-CM
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
## 2017-11-22 LAB
ALBUMIN/GLOB SERPL: 0.8 {RATIO} (ref 1.1–1.8)
ALP SERPL-CCNC: 151 U/L (ref 38–126)
ALT SERPL-CCNC: 15 U/L (ref 7–56)
APTT BLD: 31.7 SECONDS (ref 25.1–36.5)
AST SERPL-CCNC: 25 U/L (ref 14–36)
BASOPHILS # BLD AUTO: 0.02 K/MM3 (ref 0–2)
BASOPHILS NFR BLD: 0.4 % (ref 0–3)
BILIRUB SERPL-MCNC: 0.8 MG/DL (ref 0.2–1.3)
BUN SERPL-MCNC: 39 MG/DL (ref 7–21)
CALCIUM SERPL-MCNC: 10 MG/DL (ref 8.4–10.5)
CHLORIDE SERPL-SCNC: 99 MMOL/L (ref 98–107)
CO2 SERPL-SCNC: 28 MMOL/L (ref 21–33)
EOSINOPHIL # BLD: 0.3 10*3/UL (ref 0–0.7)
EOSINOPHIL NFR BLD: 6 % (ref 1.5–5)
ERYTHROCYTE [DISTWIDTH] IN BLOOD BY AUTOMATED COUNT: 22.6 % (ref 11.5–14.5)
GLOBULIN SER-MCNC: 5 GM/DL
GLUCOSE SERPL-MCNC: 78 MG/DL (ref 70–110)
GRANULOCYTES # BLD: 3.41 10*3/UL (ref 1.4–6.5)
GRANULOCYTES NFR BLD: 61.6 % (ref 50–68)
HCT VFR BLD CALC: 39 % (ref 36–48)
INR PPP: 1 (ref 0.93–1.08)
LYMPHOCYTES # BLD: 1.2 10*3/UL (ref 1.2–3.4)
LYMPHOCYTES NFR BLD AUTO: 21.5 % (ref 22–35)
MCH RBC QN AUTO: 30.2 PG (ref 25–35)
MCHC RBC AUTO-ENTMCNC: 31 G/DL (ref 31–37)
MCV RBC AUTO: 97.3 FL (ref 80–105)
MONOCYTES # BLD AUTO: 0.6 10*3/UL (ref 0.1–0.6)
MONOCYTES NFR BLD: 10.5 % (ref 1–6)
PLATELET # BLD: 215 10^3/UL (ref 120–450)
PMV BLD AUTO: 10.1 FL (ref 7–11)
POTASSIUM SERPL-SCNC: 5.4 MMOL/L (ref 3.6–5)
PROT SERPL-MCNC: 8.9 G/DL (ref 5.8–8.3)
SODIUM SERPL-SCNC: 141 MMOL/L (ref 132–148)
WBC # BLD AUTO: 5.5 10^3/UL (ref 4.5–11)

## 2017-11-22 PROCEDURE — B546ZZA ULTRASONOGRAPHY OF RIGHT SUBCLAVIAN VEIN, GUIDANCE: ICD-10-PCS | Performed by: RADIOLOGY

## 2017-11-22 PROCEDURE — 02H633Z INSERTION OF INFUSION DEVICE INTO RIGHT ATRIUM, PERCUTANEOUS APPROACH: ICD-10-PCS | Performed by: RADIOLOGY

## 2017-11-22 NOTE — ED PDOC
Arrival/HPI





- General


Historian: Patient





<Marlene Rushing PA-C - Last Filed: 11/22/17 22:49>





<Abdiaziz Soria - Last Filed: 11/23/17 00:00>





- General


Chief Complaint: Medical Clearance


Time Seen by Provider: 11/22/17 18:52





- History of Present Illness


Narrative History of Present Illness (Text): 





11/22/17 19:26


57-year-old female with past medical history of diabetes, HIV, end-stage renal 

disease on dialysis Monday Wednesday and Friday, presents for evaluation of 

failed access site for her dialysis. Patient states that she is here to see Dr. Kodi Watters to have an access placed. Patient states the last time she had 

dialysis was on Monday and is due to have one today. Denies any fever, chills, 

headache, dizziness, chest pain, shortness of breath, abdominal pain, nausea, 

vomiting. Otherwise the patient has no additional complaints at this time, 

states that she feels well.


PMD Ramonita (Marlene Rushing PA-C)





Past Medical History





- Provider Review


Nursing Documentation Reviewed: Yes





- Infectious Disease


Hx of Infectious Diseases: None





- Cardiac


Hx Pacemaker: No





- Pulmonary


Hx Respiratory Disorders: No





- Neurological


Hx Dementia: Yes





- HEENT


Hx HEENT Disorder: Yes


Hx Glaucoma: Yes (L Eye)





- Renal


Hx Renal Disorder: Yes


Hx Dialysis: Yes


Date of Last Dialysis Treatment: 10/30/17





- Endocrine/Metabolic


Hx Hypothyroidism: Yes





- Hematological/Oncological


Hx Blood Transfusions:  (UNKNOWN)





- Integumentary


Hx Dermatological Disorder: No





- Musculoskeletal/Rheumatological


Hx Musculoskeletal Disorders: Yes





- Gastrointestinal


Hx Gastrointestinal Disorders: No





- Genitourinary/Gynecological


Hx Genitourinary Disorders: Yes


Other/Comment: oliguria, on hemodialysis MWF





- Psychiatric


Hx Emotional Abuse: No


Hx Physical Abuse: No


Hx Substance Use: No





- Surgical History


Hx Amputation: Yes (RIGHT BKA)


Hx Orthopedic Surgery: Yes


Hx Vascular Access Device: Yes (R lower back area for HD)





- Anesthesia


Hx Anesthesia Reactions: No





- Suicidal Assessment


Feels Threatened In Home Enviroment: No





<Marlene Rushing PA-C - Last Filed: 11/22/17 22:49>





Family/Social History





- Physician Review


Nursing Documentation Reviewed: Yes


Family/Social History: Unknown Family HX


Smoking Status: Former Smoker


Hx Alcohol Use: No


Hx Substance Use: No





<Marlene Rushing PA-C - Last Filed: 11/22/17 22:49>





Allergies/Home Meds





<Marlene Rushing PA-C - Last Filed: 11/22/17 22:49>





<Abdiaziz Soria - Last Filed: 11/23/17 00:00>


Allergies/Adverse Reactions: 


Allergies





No Known Allergies Allergy (Verified 11/17/17 12:01)


 








Home Medications: 


 Home Meds











 Medication  Instructions  Recorded  Confirmed


 


Nateglinide 60 mg PO DAILY 02/15/14 11/21/17


 


Brimonidine Tartrate/Timolol 1 drop OP Q12H 04/14/14 11/21/17





[Combigan 0.2%-0.5% Eye Drops]   


 


Emtricitabine/Tenofovir Diso 1 tab PO DAILY 11/17/17 11/21/17





[Truvada 200 MG-300 MG]   


 


Insulin Human Regular [Novolin R] 1 - 7 unit SC ACHS 11/17/17 11/21/17


 


Menthol/Zinc Oxide [Calmoseptine 1 applic TP Q8H 11/17/17 11/21/17





Ointment]   














Review of Systems





- Review of Systems


Constitutional: Normal.  absent: Fatigue, Weight Change, Fevers


Respiratory: Normal.  absent: SOB, Cough, Sputum


Cardiovascular: Normal.  absent: Chest Pain, Palpitations, Edema


Gastrointestinal: Normal.  absent: Abdominal Pain, Stool Changes, Constipation


Musculoskeletal: Normal


Skin: Normal.  absent: Rash, Pruritis, Skin Lesions


Neurological: Normal.  absent: Headache, Dizziness, Focal Weakness





<Marlene Rushing PA-C - Last Filed: 11/22/17 22:49>





Physical Exam





<Marlene Rushing PA-C - Last Filed: 11/22/17 22:49>





<Abdiaziz Sroia - Last Filed: 11/23/17 00:00>





- Physical Exam


Narrative Physical Exam (Text): 





11/22/17 19:29


GENERAL APPEARANCE: Patient is awake, alert, oriented x 3, in no acute 

distress. 


SKIN:  Warm, dry; (-) cyanosis.


EYES:  (-) conjunctival pallor.


ENMT:  Mucous membranes moist.


NECK:  (-) tenderness, (-) stiffness, (-) lymphadenopathy, (-) JVD.


CHEST AND RESPIRATORY:  (-) rash, (-) chest wall tenderness.  Lungs:  (-) rales

, (-) rhonchi, (-) wheezes, (-) rub; breath sounds equal bilaterally.


HEART AND CARDIOVASCULAR:  (-) irregularity; (-) murmur, (-) gallop, (-) rub.


ABDOMEN AND GI:  (+) dialysis catheter to the R flank, Soft; (-) distention, (-

) tenderness, (-) palpable pulsatile mass.


EXTREMITIES:  (+) picc line to the R arm, (-) deformity; (-) edema, (-) calf 

tenderness.   (+) distal pulses.


NEURO AND PSYCH:  Mental status as above.  Cranial nerves grossly intact; 

strength symmetric.   (Marlene Rushing PA-C)





Vital Signs











  Temp Pulse Resp BP Pulse Ox


 


 11/22/17 22:45   68  12  181/75 H  99


 


 11/22/17 22:30   73   182/78 H  98


 


 11/22/17 22:15   78  18  196/80 H  98


 


 11/22/17 22:00   74  16  145/87  94 L


 


 11/22/17 18:52  99.6 F  76  18  130/73  99














Medical Decision Making





<Marlene Rushing PA-C - Last Filed: 11/22/17 22:49>





<Abdiaziz Soria - Last Filed: 11/23/17 00:00>


ED Course and Treatment: 





11/22/17 19:28


57-year-old female with past medical history of diabetes, HIV, end-stage renal 

disease on dialysis Monday Wednesday and Friday, presents for evaluation of 

failed access site for her dialysis, she is here to see Dr. Kodi Watters to have 

an access placed.





Dr. Watters called and spoke to the ER RN and is requesting for labs and IV 

access at this time. He is requesting to be called once the lab results have 

returned.





Plan:


-- Labs


-- IV 


-- Reassess and disposition





ER RN attempted to obtain labs from picc line, however she states that the picc 

line is not functional. IV access obtained instead, labs drawn and sent. 





Patient went to vascular with Dr. JOSUE Watters. 





Labs reviewed. K 5.4. EKG and kayexalate PO ordered. 





Case d/w Dr. Sam and medical resident agree with plan for inpt admit for HD 

tomorrow.  (Сергей YIP,Marlene KRISHNAN)





- Lab Interpretations


Lab Results: 











 11/22/17 19:05 





 11/22/17 20:09 





 Lab Results





11/22/17 20:25: Blood Type B POSITIVE, Antibody Screen Negative, BBK History 

Checked Patient has bt


11/22/17 20:09: Sodium 141, Potassium 5.4 H, Chloride 99, Carbon Dioxide 28, 

Anion Gap 19, BUN 39 H, Creatinine 7.3 H, Est GFR ( Amer) 7, Est GFR (Non

-Af Amer) 6, Random Glucose 78, Calcium 10.0, Total Bilirubin 0.8, AST 25, ALT 

15, Alkaline Phosphatase 151 H D, Total Protein 8.9 H, Albumin 3.9, Globulin 5.0

, Albumin/Globulin Ratio 0.8 L


11/22/17 20:09: PT 10.9, INR 1.00, APTT 31.7


11/22/17 19:05: WBC 5.5  D, RBC 4.01, Hgb 12.1, Hct 39.0, MCV 97.3, MCH 30.2, 

MCHC 31.0, RDW 22.6 H, Plt Count 215, MPV 10.1, Gran % 61.6, Lymph % (Auto) 

21.5 L, Mono % (Auto) 10.5 H, Eos % (Auto) 6.0 H, Baso % (Auto) 0.4, Gran # 3.41

, Lymph # 1.2, Mono # 0.6, Eos # 0.3, Baso # 0.02











- RAD Interpretation


Radiology Orders: 











11/22/17 20:00


CAR PERIPHERAL VASCULAR ORDER [VASCULAR] Stat 














- Medication Orders


Current Medication Orders: 














Discontinued Medications





Sodium Polystyrene Sulfonate (Kayexalate Susp)  30 gm PO STAT STA


   Stop: 11/22/17 20:56











- PA / NP / Resident Statement


CARLYN has reviewed & agrees with the documentation as recorded.





<Marlene Rushing PA-C - Last Filed: 11/22/17 22:49>





- PA / NP / Resident Statement


MD/DO has reviewed & agrees with the documentation as recorded.





<Abdiaziz Soria - Last Filed: 11/23/17 00:00>





Disposition/Present on Arrival





- Present on Arrival


Any Indicators Present on Arrival: Yes


History of DVT/PE: No


History of Uncontrolled Diabetes: Yes


Urinary Catheter: No


History of Decub. Ulcer: No


History Surgical Site Infection Following: None





- Disposition


Have Diagnosis and Disposition been Completed?: Yes


Disposition Time: 21:00


Patient Plan: Admission





<Marlene Rushing PA-C - Last Filed: 11/22/17 22:49>





<Abdiaziz Soria - Last Filed: 11/23/17 00:00>





- Disposition


Diagnosis: 


 ESRD (end stage renal disease), Hemodialysis catheter malfunction





Disposition: HOSPITALIZED


Condition: STABLE

## 2017-11-23 LAB
ALBUMIN/GLOB SERPL: 0.8 {RATIO} (ref 1.1–1.8)
ALP SERPL-CCNC: 114 U/L (ref 38–126)
ALT SERPL-CCNC: 23 U/L (ref 7–56)
AST SERPL-CCNC: 29 U/L (ref 14–36)
BASOPHILS # BLD AUTO: 0.02 K/MM3 (ref 0–2)
BASOPHILS NFR BLD: 0.6 % (ref 0–3)
BILIRUB SERPL-MCNC: 0.6 MG/DL (ref 0.2–1.3)
BUN SERPL-MCNC: 33 MG/DL (ref 7–21)
CALCIUM SERPL-MCNC: 8.9 MG/DL (ref 8.4–10.5)
CHLORIDE SERPL-SCNC: 99 MMOL/L (ref 98–107)
CO2 SERPL-SCNC: 31 MMOL/L (ref 21–33)
EOSINOPHIL # BLD: 0.2 10*3/UL (ref 0–0.7)
EOSINOPHIL NFR BLD: 6.7 % (ref 1.5–5)
ERYTHROCYTE [DISTWIDTH] IN BLOOD BY AUTOMATED COUNT: 22.3 % (ref 11.5–14.5)
GLOBULIN SER-MCNC: 4.2 GM/DL
GLUCOSE SERPL-MCNC: 96 MG/DL (ref 70–110)
GRANULOCYTES # BLD: 2.37 10*3/UL (ref 1.4–6.5)
GRANULOCYTES NFR BLD: 66 % (ref 50–68)
HCT VFR BLD CALC: 32.5 % (ref 36–48)
LYMPHOCYTES # BLD: 0.7 10*3/UL (ref 1.2–3.4)
LYMPHOCYTES NFR BLD AUTO: 19.2 % (ref 22–35)
MAGNESIUM SERPL-MCNC: 2.5 MG/DL (ref 1.7–2.2)
MCH RBC QN AUTO: 29.1 PG (ref 25–35)
MCHC RBC AUTO-ENTMCNC: 29.5 G/DL (ref 31–37)
MCV RBC AUTO: 98.5 FL (ref 80–105)
MONOCYTES # BLD AUTO: 0.3 10*3/UL (ref 0.1–0.6)
MONOCYTES NFR BLD: 7.5 % (ref 1–6)
PHOSPHATE SERPL-MCNC: 6.4 MG/DL (ref 2.5–4.5)
PLATELET # BLD: 164 10^3/UL (ref 120–450)
PMV BLD AUTO: 9.9 FL (ref 7–11)
POTASSIUM SERPL-SCNC: 5 MMOL/L (ref 3.6–5)
PROT SERPL-MCNC: 7.5 G/DL (ref 5.8–8.3)
SODIUM SERPL-SCNC: 139 MMOL/L (ref 132–148)
WBC # BLD AUTO: 3.6 10^3/UL (ref 4.5–11)

## 2017-11-23 PROCEDURE — 5A1D70Z PERFORMANCE OF URINARY FILTRATION, INTERMITTENT, LESS THAN 6 HOURS PER DAY: ICD-10-PCS | Performed by: INTERNAL MEDICINE

## 2017-11-23 RX ADMIN — Medication SCH TAB: at 09:18

## 2017-11-23 RX ADMIN — PANTOPRAZOLE SODIUM SCH MG: 40 TABLET, DELAYED RELEASE ORAL at 06:42

## 2017-11-23 RX ADMIN — LOPINAVIR AND RITONAVIR SCH: 200; 50 TABLET, FILM COATED ORAL at 12:07

## 2017-11-23 RX ADMIN — LOPINAVIR AND RITONAVIR SCH CAP: 200; 50 TABLET, FILM COATED ORAL at 17:22

## 2017-11-23 NOTE — CP.PCM.HP
<Trini Ware - Last Filed: 11/23/17 01:43>





History of Present Illness





- History of Present Illness


History of Present Illness: 





Trini Ware DO PGY1 - Internal Medicine H&P





CC: Dialysis access





HPI: 56 yo F with PMH of diabetes, HTN, HLD, ESRD on HD (MWF), HIV, foot 

ulcerations, peripheral vascular disease. Presented to the ER for failure of 

her dialysis access site. According to the son, the pastient last had dialysis 

on Sunday (3 days ago), and was due for dialysis yesterday, but in the dialysis 

center, the access site (in right low back, IVC) was found to be obstructed. 

She presented to the ER, and now is s/p placement of portacath with Dr. Kodi Watters. She is seen at bedside, still sleepy/lethargic after her procedure, but 

arousable and responsive. She reports feeling well overall, with no particular 

complaints. She now denies any CP, SOB, fever, chills, N/V/D, headache.





12 point ROS obtained and was negative except as in HPI





PMH: As above


PSH: Right BKA, multiple AVF, portacath placement today


Soc: Denies tobacco, alcohol, or illicits


FHx: Noncontributory


All: NKDA





Present on Admission





- Present on Admission


Any Indicators Present on Admission: No





Past Patient History





- Infectious Disease


Hx of Infectious Diseases: None





- Past Medical History & Family History


Past Medical History?: Yes





- Past Social History


Smoking Status: Former Smoker





- CARDIAC


Hx Cardiac Disorders: Yes


Hx Congestive Heart Failure: Yes


Hx Hypertension: Yes





- PULMONARY


Hx Respiratory Disorders: No





- NEUROLOGICAL


Hx Dementia: Yes





- HEENT


Hx HEENT Problems: Yes (wears glasses)


Hx Glaucoma: Yes





- RENAL


Hx Chronic Kidney Disease: Yes


Hx Renal Failure: Yes





- ENDOCRINE/METABOLIC


Hx Endocrine Disorders: Yes


Hx Diabetes Mellitus Type 2: Yes


Hx Hypothyroidism: Yes





- HEMATOLOGICAL/ONCOLOGICAL


Hx Blood Disorders: Yes


Hx Human Immunodeficiency Virus (HIV): Yes





- INTEGUMENTARY


Hx Dermatological Problems: No





- MUSCULOSKELETAL/RHEUMATOLOGICAL


Hx Musculoskeletal Disorders: Yes (Right BKA)





- GASTROINTESTINAL


Hx Gastrointestinal Disorders: No





- GENITOURINARY/GYNECOLOGICAL


Hx Genitourinary Disorders: Yes


Other/Comment: oliguria, on hemodialysis MWF





- PSYCHIATRIC


Hx Emotional Abuse: No


Hx Physical Abuse: No


Hx Substance Use: No





- SURGICAL HISTORY


Hx Surgeries: Yes


Hx Amputation: Yes (Right BKA)





- ANESTHESIA


Hx Anesthesia Reactions: No





Meds


Allergies/Adverse Reactions: 


 Allergies











Allergy/AdvReac Type Severity Reaction Status Date / Time


 


No Known Allergies Allergy   Verified 11/17/17 12:01














Physical Exam





- Constitutional


Appears: Non-toxic, No Acute Distress, Chronically Ill





- Head Exam


Head Exam: ATRAUMATIC, NORMOCEPHALIC





- Eye Exam


Eye Exam: EOMI, Normal appearance





- ENT Exam


ENT Exam: Mucous Membranes Moist





- Neck Exam


Neck exam: Positive for: Normal Inspection





- Respiratory Exam


Respiratory Exam: Clear to Auscultation Bilateral, NORMAL BREATHING PATTERN





- Cardiovascular Exam


Cardiovascular Exam: REGULAR RHYTHM, +S1, +S2





- GI/Abdominal Exam


GI & Abdominal Exam: Normal Bowel Sounds, Soft.  absent: Tenderness





- Extremities Exam


Additional comments: 





Right BKA, left foot with healing ulcer





- Neurological Exam


Neurological exam: Alert, Oriented x3





- Psychiatric Exam


Psychiatric exam: Normal Affect, Normal Mood





- Skin


Skin Exam: Dry, Intact





Results





- Vital Signs


Recent Vital Signs: 





 Last Vital Signs











Temp  99.6 F   11/22/17 18:52


 


Pulse  68   11/22/17 22:45


 


Resp  12   11/22/17 22:45


 


BP  181/75 H  11/22/17 22:45


 


Pulse Ox  99   11/22/17 22:45














- Labs


Result Diagrams: 


 11/22/17 19:05





 11/22/17 20:09





Assessment & Plan





- Assessment and Plan (Free Text)


Assessment: 





56 yo F with PMH of diabetes, HTN, HLD, ESRD on HD (MWF), HIV, foot ulcerations

, peripheral vascular disease. Presented for failed dialysis access, missed 

dialysis session today; now s/p portacath placement





1. Failed dialysis access, s/p portacath placement


- POD0, site appears C/D/I, without bleeding or drainage


- IR (Dr. Watters) following, appreciate recs





2. ESRD on HD (M/W/F)


- Patient missed scheduled dialysis session yesterday due to failed access, 

last dialysis on 11/19/17


- Consult nephro, appreciate recs


- Plan for HD tomorrow


- Continue home meds





3. HTN


- Patient has persistently elevated BP, including immediately postoperatively


- Current elevation likely 2/2 missed HD session today, while patient is anuric


- Patient is asymptomatic, without signs of acute end-organ damage


- Resume home meds, continue to monitor





4. h/o DM, HLD, HIV, PVD


- Continue home meds





GI/DVT Ppx: Protonix, SCDs





<Karla Sam - Last Filed: 11/23/17 06:41>





Results





- Vital Signs


Recent Vital Signs: 





 Last Vital Signs











Temp  98 F   11/23/17 06:00


 


Pulse  67   11/23/17 06:00


 


Resp  20   11/23/17 06:00


 


BP  135/74   11/23/17 06:00


 


Pulse Ox  100   11/23/17 06:00














- Labs


Result Diagrams: 


 11/22/17 19:05





 11/22/17 20:09





Attending/Attestation





- Attestation


I have personally seen and examined this patient.: Yes


I have fully participated in the care of the patient.: Yes


I have reviewed all pertinent clinical information: Yes


Notes (Text): 





11/23/17 06:39


Patient was seen when she was in bed # 372-01.


She was reluctant to talk.


Agree with history, physical examination, assessment and plan.

## 2017-11-23 NOTE — CP.PCM.CON
History of Present Illness





- History of Present Illness


History of Present Illness: 





Initial Nephrology Consultation:





Assessment: Stable


dialysis access dysfunction, Hyperkalemia


Diabetic chronic Kidney Disease (E11.22) 


Hypertensive Chronic Kidney Disease (I12.0)


End stage renal disease (N18.6) dependence on hemodialysis (Z99.2) (MWF) via 

catheter


Anemia (D64.9), Hyperphosphatemia (E83.39), Secondary Hyperparathyroidism (E21.1

), HTN (I12.0)





Plan:


for HD today as ordered but unable to complete HD today due to new catheter 

dysfunction. IR consult for alternative strategy to obtain dialysis access. 

thereafter, plan for next HD tomorrow


continue with Nephrovite 1 tab/day. 


PRBC as needed for anemia. On ISABEL as aransep weekly, last Hb 9.8


Continue with phos binders home dose, last phos level 6.4


BP control with meds as ordered. Patient not on RAAS blocker but may add if BP 

high


Glycemic control, Dialysis consistent diet


Further work up/management as per primary team


Dose meds/antibiotics (if needed) for ESRD status. Avoid fleets enema/magnesium 

based laxatives.


d/c plan once has functional dialysis access





Thanks for allowing me to participate in care of your patient. Will follow 

patient with you. Please call if any Qs. d/w team


Dr Babar Samaniego


Office: [phone] Cell: [phone] Fax: [phone]





Chief Complaint; unable to get dialysis


HPI: Pt is a 56 y/o F with hx of ESRD on hemodialysis (MWF) via IVC catheter, 

last dialysis sunday, chronic anemia, hyperphosphatemia, secondary 

hyperparathyroidism, Diabetes Mellitus, hypertension, HIV came as unable to get 

dialysis from her access. she has new permacath placed by IR.


Denies chest pain, palpitation, shortness of breath, leg swelling


she is on HD x 8 years with Dr Moore @ [facility]





ROS: 


Constitutional Symptoms: Denies fever. No chills. No Recent Weight Changes 


Eyes: denies change in vision, denies watery eyes, denies double vision


Ears/Nose/Mouth/Throat: Denies Abnormal Taste. No Bad breath or Bad Taste.


Cardiovascular: No chest pain. There is no shortness of breath. No 

palpitations. 


Pulmonary: No shortness of breath or cough.


Gastrointestinal: denies abdominal pain No nausea. No vomiting. Denies change 

in bowel habits. Denies Bleeding 


Genitourinary: makes no urine


Neurological: Denies headaches. No dizziness. Denies loss of balance. Denies 

weakness, denies tingling/numbness


Dermatological: No Rash or Bruising or ulcers.


Psychiatric: Denies Anxiety. No depression. Denies hallucinations. 


Rheumatological: No joint pain. Denies Joint swelling


Endocrine: Denies over tiredness. Denies Fatigue and denies Heat/Cold 

Intolerance. 


All other negative. 





Physical Examination:       


General Appearance: Comfortable, in no acute respiratory distress, co-operative 

. 


Vitals reviewed and noted as below


Head; Atraumatic, normocephalic


ENT: no ulcers no thrush. Tongue is midline. Oropharynx: no rash or ulcers.


EYES: Pupils are equal, round and reactive to light accommodation. Eye muscles 

and extraocular movement intact. Sclera is anicteric.


Neck; supple no lymphadenopathy, no thyromegaly or bruit


Lungs: Normal respiratory rate/effort. Breath sounds bilateral equal and clear


Heart: Normal rate. s1s2 normal. No rub or gallop. 


Extremities: no edema. No varicose veins. has Rt BKA


Neurological: Patient is alert, awake and oriented to person, place and time. 

No focal deficit. Strength bilateral appropriate and equal


Skin: Warm and dry. Normal turgor. No rash. Palpitation: Normal elasticity for 

age


Abdomen: Abdomen is soft. Bowel sounds +. There is no abdominal tenderness, no 

guarding/rigidity or organomegaly


Psych: normal insight and normal affect/mood


MSK: no joint tenderness or swelling. Digits and nails normal, no deformity


: kidney or bladder not palpable


Access: permacath





Labs/imaging reviewed.


Past medical history, past surgical history, family history, social history, 

allergy reviewed and noted as below


Family Hx: no hx of CKD. Non contributory 





Past Patient History





- Infectious Disease


Hx of Infectious Diseases: None





- Past Medical History & Family History


Past Medical History?: Yes





- Past Social History


Smoking Status: Former Smoker





- CARDIAC


Hx Cardiac Disorders: Yes


Hx Congestive Heart Failure: Yes


Hx Hypertension: Yes





- PULMONARY


Hx Respiratory Disorders: No





- NEUROLOGICAL


Hx Dementia: Yes





- HEENT


Hx HEENT Problems: Yes (wears glasses)


Hx Glaucoma: Yes





- RENAL


Hx Chronic Kidney Disease: Yes


Hx Renal Failure: Yes





- ENDOCRINE/METABOLIC


Hx Endocrine Disorders: Yes


Hx Diabetes Mellitus Type 2: Yes


Hx Hypothyroidism: Yes





- HEMATOLOGICAL/ONCOLOGICAL


Hx Blood Disorders: Yes


Hx Human Immunodeficiency Virus (HIV): Yes





- INTEGUMENTARY


Hx Dermatological Problems: No





- MUSCULOSKELETAL/RHEUMATOLOGICAL


Hx Musculoskeletal Disorders: Yes (Right BKA)





- GASTROINTESTINAL


Hx Gastrointestinal Disorders: No





- GENITOURINARY/GYNECOLOGICAL


Hx Genitourinary Disorders: Yes


Other/Comment: oliguria, on hemodialysis MWF





- PSYCHIATRIC


Hx Emotional Abuse: No


Hx Physical Abuse: No


Hx Substance Use: No





- SURGICAL HISTORY


Hx Surgeries: Yes


Hx Amputation: Yes (Right BKA)





- ANESTHESIA


Hx Anesthesia Reactions: No





Meds


Allergies/Adverse Reactions: 


 Allergies











Allergy/AdvReac Type Severity Reaction Status Date / Time


 


No Known Allergies Allergy   Verified 11/17/17 12:01














- Medications


Medications: 


 Current Medications





Clopidogrel Bisulfate (Plavix)  75 mg PO DAILY [facility]


   Last Admin: 11/23/17 12:08 Dose:  Not Given


Darbepoetin Yoan (Aranesp)  40 mcg IVP ONCE ONE


   Stop: 11/24/17 13:56


Glyburide (Micronase)  1.25 mg PO BRKDIN [facility]


   Last Admin: 11/23/17 09:19 Dose:  Not Given


Heparin Sodium (Porcine) (Heparin)  4,000 units IVP TTS [facility]


   Last Admin: 11/23/17 11:30 Dose:  4,000 units


Isosorbide Mononitrate (Imdur)  60 mg PO ACB [facility]


   Last Admin: 11/23/17 09:18 Dose:  60 mg


Lopinavir/Ritonavir (Kaletra 200-50 Mg)  2 cap PO BID [facility]


   Last Admin: 11/23/17 12:07 Dose:  Not Given


Metoprolol Tartrate (Lopressor)  25 mg PO BRKDIN [facility]


   Last Admin: 11/23/17 06:45 Dose:  25 mg


Nateglinide (Starlix)  60 mg PO AC [facility]


   Last Admin: 11/23/17 12:10 Dose:  Not Given


Pantoprazole Sodium (Protonix Ec Tab)  40 mg PO 0600 [facility]


   Last Admin: 11/23/17 06:42 Dose:  40 mg


Raltegravir (Isentress)  400 mg PO BID [facility]


   Last Admin: 11/23/17 12:07 Dose:  Not Given


Sevelamer HCl (Renagel)  800 mg PO TID [facility]


   Last Admin: 11/23/17 15:34 Dose:  800 mg


Vitamin B Complex/Vit C/Folic Acid (Nephro-Dee)  1 tab PO 0800 [facility]


   Last Admin: 11/23/17 09:18 Dose:  1 tab











Results





- Vital Signs


Recent Vital Signs: 


 Last Vital Signs











Temp  98.0 F   11/23/17 06:00


 


Pulse  72   11/23/17 14:00


 


Resp  20   11/23/17 06:00


 


BP  135/74   11/23/17 06:45


 


Pulse Ox  100   11/23/17 06:00














- Labs


Result Diagrams: 


 11/23/17 10:30





 11/23/17 10:30


Labs: 


 Laboratory Results - last 24 hr











  11/23/17 11/23/17





  10:30 10:30


 


WBC  3.6 L D 


 


RBC  3.30 L 


 


Hgb  9.6 L D 


 


Hct  32.5 L 


 


MCV  98.5 


 


MCH  29.1 


 


MCHC  29.5 L 


 


RDW  22.3 H 


 


Plt Count  164 


 


MPV  9.9 


 


Gran %  66.0 


 


Lymph % (Auto)  19.2 L 


 


Mono % (Auto)  7.5 H 


 


Eos % (Auto)  6.7 H 


 


Baso % (Auto)  0.6 


 


Gran #  2.37 


 


Lymph #  0.7 L 


 


Mono #  0.3 


 


Eos #  0.2 


 


Baso #  0.02 


 


Sodium   139


 


Potassium   5.0


 


Chloride   99


 


Carbon Dioxide   31


 


Anion Gap   14


 


BUN   33 H


 


Creatinine   6.3 H


 


Est GFR ( Amer)   8


 


Est GFR (Non-Af Amer)   7


 


Random Glucose   96


 


Calcium   8.9


 


Phosphorus   6.4 H


 


Magnesium   2.5 H


 


Total Bilirubin   0.6


 


AST   29


 


ALT   23


 


Alkaline Phosphatase   114


 


Total Protein   7.5


 


Albumin   3.3


 


Globulin   4.2


 


Albumin/Globulin Ratio   0.8 L

## 2017-11-24 LAB
ALBUMIN/GLOB SERPL: 0.8 {RATIO} (ref 1.1–1.8)
ALP SERPL-CCNC: 126 U/L (ref 38–126)
ALT SERPL-CCNC: 14 U/L (ref 7–56)
AST SERPL-CCNC: 23 U/L (ref 14–36)
BASOPHILS # BLD AUTO: 0.01 K/MM3 (ref 0–2)
BASOPHILS NFR BLD: 0.2 % (ref 0–3)
BILIRUB SERPL-MCNC: 0.6 MG/DL (ref 0.2–1.3)
BUN SERPL-MCNC: 42 MG/DL (ref 7–21)
CALCIUM SERPL-MCNC: 9.5 MG/DL (ref 8.4–10.5)
CHLORIDE SERPL-SCNC: 100 MMOL/L (ref 98–107)
CO2 SERPL-SCNC: 27 MMOL/L (ref 21–33)
EOSINOPHIL # BLD: 0.2 10*3/UL (ref 0–0.7)
EOSINOPHIL NFR BLD: 3.5 % (ref 1.5–5)
ERYTHROCYTE [DISTWIDTH] IN BLOOD BY AUTOMATED COUNT: 22 % (ref 11.5–14.5)
GLOBULIN SER-MCNC: 4.4 GM/DL
GLUCOSE SERPL-MCNC: 35 MG/DL (ref 70–110)
GRANULOCYTES # BLD: 3.99 10*3/UL (ref 1.4–6.5)
GRANULOCYTES NFR BLD: 62.8 % (ref 50–68)
HCT VFR BLD CALC: 33.1 % (ref 36–48)
LYMPHOCYTES # BLD: 1.2 10*3/UL (ref 1.2–3.4)
LYMPHOCYTES NFR BLD AUTO: 18.8 % (ref 22–35)
MAGNESIUM SERPL-MCNC: 2.7 MG/DL (ref 1.7–2.2)
MCH RBC QN AUTO: 29.4 PG (ref 25–35)
MCHC RBC AUTO-ENTMCNC: 29.9 G/DL (ref 31–37)
MCV RBC AUTO: 98.2 FL (ref 80–105)
MONOCYTES # BLD AUTO: 0.9 10*3/UL (ref 0.1–0.6)
MONOCYTES NFR BLD: 14.7 % (ref 1–6)
PHOSPHATE SERPL-MCNC: 6.9 MG/DL (ref 2.5–4.5)
PLATELET # BLD: 136 10^3/UL (ref 120–450)
PMV BLD AUTO: 10 FL (ref 7–11)
POTASSIUM SERPL-SCNC: 5 MMOL/L (ref 3.6–5)
PROT SERPL-MCNC: 7.9 G/DL (ref 5.8–8.3)
SODIUM SERPL-SCNC: 138 MMOL/L (ref 132–148)
WBC # BLD AUTO: 6.3 10^3/UL (ref 4.5–11)

## 2017-11-24 RX ADMIN — DARBEPOETIN ALFA ONE MCG: 40 SOLUTION INTRAVENOUS; SUBCUTANEOUS at 12:41

## 2017-11-24 RX ADMIN — DARBEPOETIN ALFA ONE: 40 SOLUTION INTRAVENOUS; SUBCUTANEOUS at 15:30

## 2017-11-24 RX ADMIN — LOPINAVIR AND RITONAVIR SCH CAP: 200; 50 TABLET, FILM COATED ORAL at 10:00

## 2017-11-24 RX ADMIN — Medication SCH TAB: at 08:20

## 2017-11-24 RX ADMIN — PANTOPRAZOLE SODIUM SCH MG: 40 TABLET, DELAYED RELEASE ORAL at 08:20

## 2017-11-24 RX ADMIN — LOPINAVIR AND RITONAVIR SCH CAP: 200; 50 TABLET, FILM COATED ORAL at 17:33

## 2017-11-24 NOTE — VASCULAR
PROCEDURE:  Ultrasound and fluoroscopic tunneled right subclavian 

dialysis catheter.



CLINICAL HISTORY:  ESRD very limited upper and lower extremity venous 

access. Non functioning trans lumbar dialysis catheter. Attempt 

tunneled catheter placement.



PHYSICIAN(S):  Kodi Watters M.D.



TECHNIQUE:

The relative risks and indications for the procedure were explained 

to the patient and informed written consent obtained. The patient was 

placed supine on the arteriography table and the right neck/chest was 

prepped and draped in the usual sterile fashion. 1% Xylocaine was 

used to anesthetize the skin and soft tissues at the puncture site. 

Conscious sedation and monitoring were provided throughout the 

procedure by a nurse.



Under fluoroscopic and ultrasound guidance, the right axillo 

subclavian segment just distal to the stents was punctured under 

ultrasound guidance with a micropuncture set. 0.035 support wire was 

advanced through the right subclavian stent and placed in the right 

atrium.   



Sequential dilatation was performed with dilators. Subsequently a can 

into catheter was advanced with its tip in the right atrium. The 

catheter was flushed and secured. 



The translumbar catheter in the right atrium also remains. 



IMPRESSION:

1. Ultrasound and fluoroscopically placed right axillo subclavian 

tunneled dialysis catheter as described above

## 2017-11-24 NOTE — CP.PCM.PN
Subjective





- Date & Time of Evaluation


Date of Evaluation: 11/24/17


Time of Evaluation: 14:02





- Subjective


Subjective: 





Nephrology Consultation:





Assessment: Stable


dialysis access dysfunction, Hyperkalemia


Diabetic chronic Kidney Disease (E11.22) 


Hypertensive Chronic Kidney Disease (I12.0)


End stage renal disease (N18.6) dependence on hemodialysis (Z99.2) (MWF) via 

catheter


Anemia (D64.9), Hyperphosphatemia (E83.39), Secondary Hyperparathyroidism (E21.1

), HTN (I12.0)





Plan:


for HD today as ordered but still has catheter dysfunction. IR consult 

appreciated, she will have special catheter placed as outpt next week. plan for 

next HD tomorrow too


continue with Nephrovite 1 tab/day. 


PRBC as needed for anemia. On ISABEL as aransep weekly, last Hb 9.8


resume phos binders home dose, last phos level 6.9


BP control with meds as ordered. Patient not on RAAS blocker but may add if BP 

high


Glycemic control, Dialysis consistent diet


Further work up/management as per primary team


Dose meds/antibiotics (if needed) for ESRD status. Avoid fleets enema/magnesium 

based laxatives.


d/c plan for tomorrow post HD





Thanks for allowing me to participate in care of your patient. Will follow 

patient with you. Please call if any Qs. d/w team


Dr Babar Samaniego


Office: [phone] Cell: [phone] Fax: [phone]





Chief Complaint; none today


HPI: Pt is a 58 y/o F with hx of ESRD on hemodialysis (MWF) via IVC catheter, 

last dialysis sunday, chronic anemia, hyperphosphatemia, secondary 

hyperparathyroidism, Diabetes Mellitus, hypertension, HIV came as unable to get 

dialysis from her access. she has new permacath placed by IR.


Denies chest pain, palpitation, shortness of breath, leg swelling


she is on HD x 8 years with Dr Moore @ [facility]





ROS: 


Constitutional Symptoms: Denies fever. No chills. No Recent Weight Changes 


Eyes: denies change in vision, denies watery eyes, denies double vision


Ears/Nose/Mouth/Throat: Denies Abnormal Taste. No Bad breath or Bad Taste.


Cardiovascular: No chest pain. There is no shortness of breath. No 

palpitations. 


Pulmonary: No shortness of breath or cough.


Gastrointestinal: denies abdominal pain No nausea. No vomiting. Denies change 

in bowel habits. Denies Bleeding 


Genitourinary: makes no urine


Neurological: Denies headaches. No dizziness. Denies loss of balance. Denies 

weakness, denies tingling/numbness


Dermatological: No Rash or Bruising or ulcers.


Psychiatric: Denies Anxiety. No depression. Denies hallucinations. 


Rheumatological: No joint pain. Denies Joint swelling


Endocrine: Denies over tiredness. Denies Fatigue and denies Heat/Cold 

Intolerance. 


All other negative. 





Physical Examination:       


General Appearance: Comfortable, in no acute respiratory distress, co-operative 

. 


Vitals reviewed and noted as below


Head; Atraumatic, normocephalic


ENT: no ulcers no thrush. Tongue is midline. Oropharynx: no rash or ulcers.


EYES: Pupils are equal, round and reactive to light accommodation. Eye muscles 

and extraocular movement intact. Sclera is anicteric.


Neck; supple no lymphadenopathy, no thyromegaly or bruit


Lungs: Normal respiratory rate/effort. Breath sounds bilateral equal and clear


Heart: Normal rate. s1s2 normal. No rub or gallop. 


Extremities: no edema. No varicose veins. has Rt BKA


Neurological: Patient is alert, awake and oriented to person, place and time. 

No focal deficit. Strength bilateral appropriate and equal


Skin: Warm and dry. Normal turgor. No rash. Palpitation: Normal elasticity for 

age


Abdomen: Abdomen is soft. Bowel sounds +. There is no abdominal tenderness, no 

guarding/rigidity or organomegaly


Psych: normal insight and normal affect/mood


MSK: no joint tenderness or swelling. Digits and nails normal, no deformity


: kidney or bladder not palpable


Access: permacath





Labs/imaging reviewed.


Past medical history, past surgical history, family history, social history, 

allergy reviewed and noted as below


Family Hx: no hx of CKD. Non contributory 











Objective





- Vital Signs/Intake and Output


Vital Signs (last 24 hours): 


 











Temp Pulse Resp BP Pulse Ox


 


 98.3 F   68   20   108/54 L  96 


 


 11/24/17 05:59  11/24/17 10:00  11/24/17 05:59  11/24/17 05:59  11/24/17 05:59








Intake and Output: 


 











 11/24/17 11/24/17





 06:59 18:59


 


Intake Total 360 


 


Balance 360 














- Medications


Medications: 


 Current Medications





Clopidogrel Bisulfate (Plavix)  75 mg PO DAILY [facility]


   Last Admin: 11/24/17 10:00 Dose:  75 mg


Glyburide (Micronase)  1.25 mg PO BRKDIN [facility]


   Last Admin: 11/24/17 07:50 Dose:  Not Given


Heparin Sodium (Porcine) (Heparin)  4,000 units IVP TTS [facility]


   Last Admin: 11/23/17 11:30 Dose:  4,000 units


Heparin Sodium (Porcine) (Heparin)  2,600 units ICA ONCE ONE


   Stop: 11/24/17 15:01


Heparin Sodium (Porcine) (Heparin)  2,800 units ICV ONCE ONE


   Stop: 11/24/17 15:01


Isosorbide Mononitrate (Imdur)  60 mg PO ACB [facility]


   Last Admin: 11/23/17 09:18 Dose:  60 mg


Lopinavir/Ritonavir (Kaletra 200-50 Mg)  2 cap PO BID [facility]


   Last Admin: 11/24/17 10:00 Dose:  2 cap


Metoprolol Tartrate (Lopressor)  25 mg PO BRKDIN [facility]


   Last Admin: 11/24/17 09:49 Dose:  Not Given


Nateglinide (Starlix)  60 mg PO AC [facility]


   Last Admin: 11/24/17 07:50 Dose:  Not Given


Pantoprazole Sodium (Protonix Ec Tab)  40 mg PO 0600 [facility]


   Last Admin: 11/24/17 08:20 Dose:  40 mg


Raltegravir (Isentress)  400 mg PO BID [facility]


   Last Admin: 11/24/17 10:00 Dose:  400 mg


Sevelamer HCl (Renagel)  800 mg PO TID [facility]


   Last Admin: 11/24/17 10:00 Dose:  800 mg


Vitamin B Complex/Vit C/Folic Acid (Nephro-Dee)  1 tab PO 0800 [facility]


   Last Admin: 11/24/17 08:20 Dose:  1 tab











- Labs


Labs: 


 





 11/24/17 06:30 





 11/24/17 06:30 





 











PT  10.9 SECONDS (9.4-12.5)   11/22/17  20:09    


 


INR  1.00  (0.93-1.08)   11/22/17  20:09    


 


APTT  31.7 Seconds (25.1-36.5)   11/22/17  20:09

## 2017-11-24 NOTE — CP.PCM.PN
<Tyler Monteiro - Last Filed: 11/24/17 11:38>





Subjective





- Date & Time of Evaluation


Date of Evaluation: 11/24/17


Time of Evaluation: 09:15





- Subjective


Subjective: 





Medicine progress note: 





Pt seen and examinned at bedside. No acute events overnight. Pt Portacath not 

working. Pt states that she is doing well with no complaints. Denies any f/c, 

headaches, sob, chest pain, n/v/d.   





Objective





- Vital Signs/Intake and Output


Vital Signs (last 24 hours): 


 











Temp Pulse Resp BP Pulse Ox


 


 98.3 F   68   20   108/54 L  96 


 


 11/24/17 05:59  11/24/17 05:59  11/24/17 05:59  11/24/17 05:59  11/24/17 05:59








Intake and Output: 


 











 11/24/17 11/24/17





 06:59 18:59


 


Intake Total 360 


 


Balance 360 














- Medications


Medications: 


 Current Medications





Clopidogrel Bisulfate (Plavix)  75 mg PO DAILY [facility]


   Last Admin: 11/24/17 10:00 Dose:  75 mg


Darbepoetin Yoan (Aranesp)  40 mcg IVP ONCE ONE


   Stop: 11/24/17 13:56


Glyburide (Micronase)  1.25 mg PO BRKDIN [facility]


   Last Admin: 11/24/17 07:50 Dose:  Not Given


Heparin Sodium (Porcine) (Heparin)  4,000 units IVP TTS [facility]


   Last Admin: 11/23/17 11:30 Dose:  4,000 units


Isosorbide Mononitrate (Imdur)  60 mg PO ACB [facility]


   Last Admin: 11/23/17 09:18 Dose:  60 mg


Lopinavir/Ritonavir (Kaletra 200-50 Mg)  2 cap PO BID [facility]


   Last Admin: 11/24/17 10:00 Dose:  2 cap


Metoprolol Tartrate (Lopressor)  25 mg PO BRKDIN [facility]


   Last Admin: 11/24/17 09:49 Dose:  Not Given


Nateglinide (Starlix)  60 mg PO AC [facility]


   Last Admin: 11/24/17 07:50 Dose:  Not Given


Pantoprazole Sodium (Protonix Ec Tab)  40 mg PO 0600 [facility]


   Last Admin: 11/24/17 08:20 Dose:  40 mg


Raltegravir (Isentress)  400 mg PO BID [facility]


   Last Admin: 11/24/17 10:00 Dose:  400 mg


Sevelamer HCl (Renagel)  800 mg PO TID [facility]


   Last Admin: 11/24/17 10:00 Dose:  800 mg


Vitamin B Complex/Vit C/Folic Acid (Nephro-Dee)  1 tab PO 0800 [facility]


   Last Admin: 11/24/17 08:20 Dose:  1 tab











- Labs


Labs: 


 





 11/24/17 06:30 





 11/24/17 06:30 





 











PT  10.9 SECONDS (9.4-12.5)   11/22/17  20:09    


 


INR  1.00  (0.93-1.08)   11/22/17  20:09    


 


APTT  31.7 Seconds (25.1-36.5)   11/22/17  20:09    














- Constitutional


Appears: No Acute Distress





- Head Exam


Head Exam: ATRAUMATIC, NORMOCEPHALIC





- Eye Exam


Eye Exam: EOMI, PERRL





- ENT Exam


ENT Exam: Mucous Membranes Moist





- Respiratory Exam


Respiratory Exam: Clear to Ausculation Bilateral.  absent: Rales, Wheezes





- Cardiovascular Exam


Cardiovascular Exam: REGULAR RHYTHM, +S1, +S2





- GI/Abdominal Exam


GI & Abdominal Exam: Soft.  absent: Tenderness





- Neurological Exam


Neurological Exam: Alert, Awake, Oriented x3





- Psychiatric Exam


Psychiatric exam: Normal Affect, Normal Mood





- Skin


Skin Exam: Dry, Intact, Warm





Assessment and Plan





- Assessment and Plan (Free Text)


Assessment: 





58 yo F with PMH of diabetes, HTN, HLD, ESRD on HD (MWF), HIV, foot ulcerations

, peripheral vascular disease  s/p portacath placement which is not 

functioning. Reconsulted vascular for recs. 





1. Failed dialysis access, s/p portacath placement which is not functioning 

properly 


- Reconsulted vascular for recs and possible replacement 





2. ESRD on HD (M/W/F)


- Consult nephro, appreciate recs


- Plan for HD once a catheter is functioning 


- Continue home Aranesp and Renagel 





3. HTN


- Cont home metoprolol 


- Continue to monitor





4. Hx of DM 


- Continue home Glyburide 





5. Hx of HIV 


- Cont home Kaletra and Isentress 





6. GI/DVT Ppx: Protonix, SCDs








Pt was seen and plan was discussed in detail with Dr Franklin. 





<Daniel Franklin - Last Filed: 11/24/17 14:09>





Objective





- Vital Signs/Intake and Output


Vital Signs (last 24 hours): 


 











Temp Pulse Resp BP Pulse Ox


 


 98.3 F   68   20   108/54 L  96 


 


 11/24/17 05:59  11/24/17 10:00  11/24/17 05:59  11/24/17 05:59  11/24/17 05:59








Intake and Output: 


 











 11/24/17 11/24/17





 06:59 18:59


 


Intake Total 360 


 


Balance 360 














- Medications


Medications: 


 Current Medications





Clopidogrel Bisulfate (Plavix)  75 mg PO DAILY [facility]


   Last Admin: 11/24/17 10:00 Dose:  75 mg


Glyburide (Micronase)  1.25 mg PO BRKDIN [facility]


   Last Admin: 11/24/17 07:50 Dose:  Not Given


Heparin Sodium (Porcine) (Heparin)  4,000 units IVP TTS [facility]


   Last Admin: 11/23/17 11:30 Dose:  4,000 units


Heparin Sodium (Porcine) (Heparin)  2,600 units ICA ONCE ONE


   Stop: 11/24/17 15:01


Heparin Sodium (Porcine) (Heparin)  2,800 units ICV ONCE ONE


   Stop: 11/24/17 15:01


Isosorbide Mononitrate (Imdur)  60 mg PO ACB [facility]


   Last Admin: 11/23/17 09:18 Dose:  60 mg


Lopinavir/Ritonavir (Kaletra 200-50 Mg)  2 cap PO BID [facility]


   Last Admin: 11/24/17 10:00 Dose:  2 cap


Metoprolol Tartrate (Lopressor)  25 mg PO BRKDIN [facility]


   Last Admin: 11/24/17 09:49 Dose:  Not Given


Nateglinide (Starlix)  60 mg PO AC [facility]


   Last Admin: 11/24/17 07:50 Dose:  Not Given


Pantoprazole Sodium (Protonix Ec Tab)  40 mg PO 0600 [facility]


   Last Admin: 11/24/17 08:20 Dose:  40 mg


Raltegravir (Isentress)  400 mg PO BID [facility]


   Last Admin: 11/24/17 10:00 Dose:  400 mg


Sevelamer HCl (Renagel)  800 mg PO TID [facility]


   Last Admin: 11/24/17 10:00 Dose:  800 mg


Vitamin B Complex/Vit C/Folic Acid (Nephro-Dee)  1 tab PO 0800 [facility]


   Last Admin: 11/24/17 08:20 Dose:  1 tab











- Labs


Labs: 


 





 11/24/17 06:30 





 11/24/17 06:30 





 











PT  10.9 SECONDS (9.4-12.5)   11/22/17  20:09    


 


INR  1.00  (0.93-1.08)   11/22/17  20:09    


 


APTT  31.7 Seconds (25.1-36.5)   11/22/17  20:09    














Attending/Attestation





- Attestation


I have personally seen and examined this patient.: Yes


I have fully participated in the care of the patient.: Yes


I have reviewed all pertinent clinical information, including history, physical 

exam and plan: Yes


Notes (Text): 





11/24/17 14:04


57 year old female with past medical history of diabetes, hypertension, 

dyslipidemia, ESRD on HD, HIV and peripheral vascular disease who presented 

dialysis catheter dysfunction.  IR evaluation was appreciated.  Case was 

discussed with Dr. Watters for special catheter placement possibly next week.  

Case also discussed with nephrology; will continue with dialysis today and 

tomorrow.


She was hypoglycemic this morning.  Review of her prior A1Cs have been <6.0.  

Will hold her glyburide and starlix for now and repeat A1C level.


Continue with home medications for HIV and hypertension.


Possible d/c planning to [facility] tomorrow after dialysis with outpatient follow up 

with IR.





Daniel Franklin MD


Hospitalist.

## 2017-11-25 VITALS — OXYGEN SATURATION: 98 %

## 2017-11-25 VITALS
TEMPERATURE: 98.6 F | RESPIRATION RATE: 18 BRPM | SYSTOLIC BLOOD PRESSURE: 178 MMHG | DIASTOLIC BLOOD PRESSURE: 85 MMHG | HEART RATE: 76 BPM

## 2017-11-25 LAB
ALBUMIN/GLOB SERPL: 0.8 {RATIO} (ref 1.1–1.8)
ALP SERPL-CCNC: 118 U/L (ref 38–126)
ALT SERPL-CCNC: 16 U/L (ref 7–56)
AST SERPL-CCNC: 71 U/L (ref 14–36)
BASOPHILS # BLD AUTO: 0.01 K/MM3 (ref 0–2)
BASOPHILS NFR BLD: 0.3 % (ref 0–3)
BILIRUB SERPL-MCNC: 0.6 MG/DL (ref 0.2–1.3)
BUN SERPL-MCNC: 32 MG/DL (ref 7–21)
CALCIUM SERPL-MCNC: 9.6 MG/DL (ref 8.4–10.5)
CHLORIDE SERPL-SCNC: 95 MMOL/L (ref 98–107)
CO2 SERPL-SCNC: 35 MMOL/L (ref 21–33)
EOSINOPHIL # BLD: 0.2 10*3/UL (ref 0–0.7)
EOSINOPHIL NFR BLD: 5.9 % (ref 1.5–5)
ERYTHROCYTE [DISTWIDTH] IN BLOOD BY AUTOMATED COUNT: 21.7 % (ref 11.5–14.5)
GLOBULIN SER-MCNC: 4.5 GM/DL
GLUCOSE SERPL-MCNC: 84 MG/DL (ref 70–110)
GRANULOCYTES # BLD: 1.91 10*3/UL (ref 1.4–6.5)
GRANULOCYTES NFR BLD: 49.1 % (ref 50–68)
HCT VFR BLD CALC: 32.7 % (ref 36–48)
LYMPHOCYTES # BLD: 1.3 10*3/UL (ref 1.2–3.4)
LYMPHOCYTES NFR BLD AUTO: 32.4 % (ref 22–35)
MAGNESIUM SERPL-MCNC: 2.4 MG/DL (ref 1.7–2.2)
MCH RBC QN AUTO: 29 PG (ref 25–35)
MCHC RBC AUTO-ENTMCNC: 29.4 G/DL (ref 31–37)
MCV RBC AUTO: 98.8 FL (ref 80–105)
MONOCYTES # BLD AUTO: 0.5 10*3/UL (ref 0.1–0.6)
MONOCYTES NFR BLD: 12.3 % (ref 1–6)
PHOSPHATE SERPL-MCNC: 5.8 MG/DL (ref 2.5–4.5)
PLATELET # BLD: 129 10^3/UL (ref 120–450)
PMV BLD AUTO: 9.8 FL (ref 7–11)
POTASSIUM SERPL-SCNC: 4.7 MMOL/L (ref 3.6–5)
PROT SERPL-MCNC: 7.9 G/DL (ref 5.8–8.3)
SODIUM SERPL-SCNC: 137 MMOL/L (ref 132–148)
WBC # BLD AUTO: 3.9 10^3/UL (ref 4.5–11)

## 2017-11-25 RX ADMIN — LOPINAVIR AND RITONAVIR SCH CAP: 200; 50 TABLET, FILM COATED ORAL at 11:08

## 2017-11-25 RX ADMIN — Medication SCH TAB: at 11:06

## 2017-11-25 RX ADMIN — PANTOPRAZOLE SODIUM SCH: 40 TABLET, DELAYED RELEASE ORAL at 06:23

## 2017-11-25 NOTE — CP.PCM.DIS
<Memo Gutierrez - Last Filed: 11/25/17 10:49>





Provider





- Provider


Date of Admission: 


11/22/17 21:16





Attending physician: 


Daniel Franklin MD





Primary care physician: 


Kodi Watters MD





Time Spent in preparation of Discharge (in minutes): 45





Diagnosis





- Discharge Diagnosis


(1) ESRD (end stage renal disease)


Status: Chronic   Priority: Medium   





(2) Hemodialysis catheter malfunction


Status: Acute   Priority: Medium   





(3) HIV disease


Status: Chronic   Priority: Medium   





(4) HTN (hypertension)


Status: Chronic   Priority: Medium   





Hospital Course





- Lab Results


Lab Results: 


 Most Recent Lab Values











WBC  3.9 10^3/ul (4.5-11.0)  L D 11/25/17  06:30    


 


RBC  3.31 10^6/uL (3.5-6.1)  L  11/25/17  06:30    


 


Hgb  9.6 g/dL (12.0-16.0)  L  11/25/17  06:30    


 


Hct  32.7 % (36.0-48.0)  L  11/25/17  06:30    


 


MCV  98.8 fl (80.0-105.0)   11/25/17  06:30    


 


MCH  29.0 pg (25.0-35.0)   11/25/17  06:30    


 


MCHC  29.4 g/dl (31.0-37.0)  L  11/25/17  06:30    


 


RDW  21.7 % (11.5-14.5)  H  11/25/17  06:30    


 


Plt Count  129 10^3/uL (120.0-450.0)   11/25/17  06:30    


 


MPV  9.8 fl (7.0-11.0)   11/25/17  06:30    


 


Gran %  49.1 % (50.0-68.0)  L  11/25/17  06:30    


 


Lymph % (Auto)  32.4 % (22.0-35.0)   11/25/17  06:30    


 


Mono % (Auto)  12.3 % (1.0-6.0)  H  11/25/17  06:30    


 


Eos % (Auto)  5.9 % (1.5-5.0)  H  11/25/17  06:30    


 


Baso % (Auto)  0.3 % (0.0-3.0)   11/25/17  06:30    


 


Gran #  1.91  (1.4-6.5)   11/25/17  06:30    


 


Lymph #  1.3  (1.2-3.4)   11/25/17  06:30    


 


Mono #  0.5  (0.1-0.6)   11/25/17  06:30    


 


Eos #  0.2  (0.0-0.7)   11/25/17  06:30    


 


Baso #  0.01 K/mm3 (0.0-2.0)   11/25/17  06:30    


 


PT  10.9 SECONDS (9.4-12.5)   11/22/17  20:09    


 


INR  1.00  (0.93-1.08)   11/22/17  20:09    


 


APTT  31.7 Seconds (25.1-36.5)   11/22/17  20:09    


 


Sodium  137 mmol/L (132-148)   11/25/17  06:30    


 


Potassium  4.7 mmol/L (3.6-5.0)   11/25/17  06:30    


 


Chloride  95 mmol/L ()  L  11/25/17  06:30    


 


Carbon Dioxide  35 mmol/L (21-33)  H  11/25/17  06:30    


 


Anion Gap  11  (10-20)   11/25/17  06:30    


 


BUN  32 mg/dL (7-21)  H  11/25/17  06:30    


 


Creatinine  5.6 mg/dl (0.7-1.2)  H  11/25/17  06:30    


 


Est GFR ( Amer)  10   11/25/17  06:30    


 


Est GFR (Non-Af Amer)  8   11/25/17  06:30    


 


Random Glucose  84 mg/dL ()   11/25/17  06:30    


 


Hemoglobin A1c  4.3 % (4.2-6.5)   11/24/17  10:40    


 


Calcium  9.6 mg/dL (8.4-10.5)   11/25/17  06:30    


 


Phosphorus  5.8 mg/dL (2.5-4.5)  H  11/25/17  06:30    


 


Magnesium  2.4 mg/dL (1.7-2.2)  H  11/25/17  06:30    


 


Total Bilirubin  0.6 mg/dL (0.2-1.3)   11/25/17  06:30    


 


AST  71 U/L (14-36)  H D 11/25/17  06:30    


 


ALT  16 U/L (7-56)   11/25/17  06:30    


 


Alkaline Phosphatase  118 U/L ()   11/25/17  06:30    


 


Total Protein  7.9 g/dL (5.8-8.3)   11/25/17  06:30    


 


Albumin  3.4 g/dL (3.0-4.8)   11/25/17  06:30    


 


Globulin  4.5 gm/dL  11/25/17  06:30    


 


Albumin/Globulin Ratio  0.8  (1.1-1.8)  L  11/25/17  06:30    


 


Blood Type  B POSITIVE   11/22/17  20:25    


 


Antibody Screen  Negative   11/22/17  20:25    


 


BBK History Checked  Patient has bt   11/22/17  20:25    














- Hospital Course


Hospital Course: 





Patient is a 57 year old female with a PMHx of diabetes, HTN, HLD, ESRD on HD (

MWF), HIV, foot ulcerations, peripheral vascular disease  who was admitted for 

evaluation and treatment of a HD access difficultly.  With the use of physical 

examinations, lab work, and imaging the patient was diagnosed with and treated 

for a protocath malfunction along with the patients other chronic medical 

conditions. During their hospital stay the patient was seen by interventional 

radiology and nephrology whose recommendations were both appreciated and 

utilized in the care for this patient. During their hospital stay the patient 

underwent insertion of a dialysis cathether in addition to hemodialysis 

sessions. Patient was treated with antihypertensive medications amongst other 

empiric/therapeutic medications. At this time the patient is medically stable 

for discharge. Patient understands and appreciates discharge plan. Patient 

instructed to follow up with primary care physicians and referrals within three 

to five days from discharge. Furthermore, the patient is instructed to take 

medications as prescribed and to return to emergency room for evaluation of 

intractable headache, fever, chills, dizziness, chest pain, shortness of breath

, abdominal pain, nausea, vomiting, diarrhea, constipation, and urinary 

symptoms. 


This is a brief summary of the patients hospital course. Please see patient 

chart for full details. 











Discharge Exam





- Head Exam


Head Exam: ATRAUMATIC, NORMOCEPHALIC





- Additional Findings


Additional findings: 





- Constitutional


Appears: No Acute Distress





- Head Exam


Head Exam: ATRAUMATIC, NORMOCEPHALIC





- Eye Exam


Eye Exam: EOMI, PERRL





- ENT Exam


ENT Exam: Mucous Membranes Moist





- Respiratory Exam


Respiratory Exam: Clear to Ausculation Bilateral.  absent: Rales, Wheezes





- Cardiovascular Exam


Cardiovascular Exam: REGULAR RHYTHM, +S1, +S2





- GI/Abdominal Exam


GI & Abdominal Exam: Soft.  absent: Tenderness





- Neurological Exam


Neurological Exam: Alert, Awake, Oriented x3





- Psychiatric Exam


Psychiatric exam: Normal Affect, Normal Mood





- Skin


Skin Exam: Dry, Intact, Warm








Discharge Plan





- Discharge Medications


Prescriptions: 


Isosorbide Mononitrate [Imdur] 60 mg PO ACB 14 Days  tab


Lopinavir/Ritonavir [Kaletra 200 MG-50 MG] 2 cap PO BID 14 Days  cap


RX: Raltegravir Potassium [Isentress] 400 mg PO BID 14 Days  tab


RX: Sevelamer [Renagel] 800 mg PO TID 14 Days  tab





- Follow Up Plan


Condition: STABLE


Disposition: NURSING FACILITY MEDICAID CERT


Additional Instructions: 


Patient Instructions:


1. Take medications as prescribed. New medications:


- Discontinue use of glyburide and starlix. 


- Start medium dose insulin sliding scale lispro with glucose checks before 

meals and at bedtime


- Isosorbide Mononitrate [Imdur] 60 mg PO ACB 


- Lopinavir/Ritonavir [Kaletra 200 MG-50 MG] 2 cap PO BID 


- Raltegravir Potassium [Isentress] 400 mg PO BID


- Sevelamer [Renagel] 800 mg PO TID 14 Days  tab





2. Follow up with PMD and referrals within three to five days from discharge.





3. Return to the emergency room for evaluation of intractable headache, fever, 

chills, dizziness, chest pain, shortness of breath, abdominal pain, nausea, 

vomiting, diarrhea, constipation, and urinary symptoms. 


Referrals: 


Kodi Watters MD [Primary Care Provider] - Follow up with primary





<Daniel Franklin - Last Filed: 11/25/17 11:11>





Provider





- Provider


Date of Admission: 


11/22/17 21:16





Attending physician: 


Daniel Franklin MD





Primary care physician: 


Kodi Watters MD








Hospital Course





- Lab Results


Lab Results: 


 Most Recent Lab Values











WBC  3.9 10^3/ul (4.5-11.0)  L D 11/25/17  06:30    


 


RBC  3.31 10^6/uL (3.5-6.1)  L  11/25/17  06:30    


 


Hgb  9.6 g/dL (12.0-16.0)  L  11/25/17  06:30    


 


Hct  32.7 % (36.0-48.0)  L  11/25/17  06:30    


 


MCV  98.8 fl (80.0-105.0)   11/25/17  06:30    


 


MCH  29.0 pg (25.0-35.0)   11/25/17  06:30    


 


MCHC  29.4 g/dl (31.0-37.0)  L  11/25/17  06:30    


 


RDW  21.7 % (11.5-14.5)  H  11/25/17  06:30    


 


Plt Count  129 10^3/uL (120.0-450.0)   11/25/17  06:30    


 


MPV  9.8 fl (7.0-11.0)   11/25/17  06:30    


 


Gran %  49.1 % (50.0-68.0)  L  11/25/17  06:30    


 


Lymph % (Auto)  32.4 % (22.0-35.0)   11/25/17  06:30    


 


Mono % (Auto)  12.3 % (1.0-6.0)  H  11/25/17  06:30    


 


Eos % (Auto)  5.9 % (1.5-5.0)  H  11/25/17  06:30    


 


Baso % (Auto)  0.3 % (0.0-3.0)   11/25/17  06:30    


 


Gran #  1.91  (1.4-6.5)   11/25/17  06:30    


 


Lymph #  1.3  (1.2-3.4)   11/25/17  06:30    


 


Mono #  0.5  (0.1-0.6)   11/25/17  06:30    


 


Eos #  0.2  (0.0-0.7)   11/25/17  06:30    


 


Baso #  0.01 K/mm3 (0.0-2.0)   11/25/17  06:30    


 


PT  10.9 SECONDS (9.4-12.5)   11/22/17  20:09    


 


INR  1.00  (0.93-1.08)   11/22/17  20:09    


 


APTT  31.7 Seconds (25.1-36.5)   11/22/17  20:09    


 


Sodium  137 mmol/L (132-148)   11/25/17  06:30    


 


Potassium  4.7 mmol/L (3.6-5.0)   11/25/17  06:30    


 


Chloride  95 mmol/L ()  L  11/25/17  06:30    


 


Carbon Dioxide  35 mmol/L (21-33)  H  11/25/17  06:30    


 


Anion Gap  11  (10-20)   11/25/17  06:30    


 


BUN  32 mg/dL (7-21)  H  11/25/17  06:30    


 


Creatinine  5.6 mg/dl (0.7-1.2)  H  11/25/17  06:30    


 


Est GFR ( Amer)  10   11/25/17  06:30    


 


Est GFR (Non-Af Amer)  8   11/25/17  06:30    


 


Random Glucose  84 mg/dL ()   11/25/17  06:30    


 


Hemoglobin A1c  4.3 % (4.2-6.5)   11/24/17  10:40    


 


Calcium  9.6 mg/dL (8.4-10.5)   11/25/17  06:30    


 


Phosphorus  5.8 mg/dL (2.5-4.5)  H  11/25/17  06:30    


 


Magnesium  2.4 mg/dL (1.7-2.2)  H  11/25/17  06:30    


 


Total Bilirubin  0.6 mg/dL (0.2-1.3)   11/25/17  06:30    


 


AST  71 U/L (14-36)  H D 11/25/17  06:30    


 


ALT  16 U/L (7-56)   11/25/17  06:30    


 


Alkaline Phosphatase  118 U/L ()   11/25/17  06:30    


 


Total Protein  7.9 g/dL (5.8-8.3)   11/25/17  06:30    


 


Albumin  3.4 g/dL (3.0-4.8)   11/25/17  06:30    


 


Globulin  4.5 gm/dL  11/25/17  06:30    


 


Albumin/Globulin Ratio  0.8  (1.1-1.8)  L  11/25/17  06:30    


 


Blood Type  B POSITIVE   11/22/17  20:25    


 


Antibody Screen  Negative   11/22/17  20:25    


 


BBK History Checked  Patient has bt   11/22/17  20:25    














Attending/Attestation





- Attestation


I have personally seen and examined this patient.: Yes


I have fully participated in the care of the patient.: Yes


I have reviewed all pertinent clinical information, including history, physical 

exam and plan: Yes


Notes (Text): 





11/25/17 11:08


57 year old female with past medical history of diabetes, hypertension, 

dyslipidemia, ESRD on HD, HIV and peripheral vascular disease who presented 

dialysis catheter dysfunction.  IR evaluation was appreciated.  Case was 

discussed with Dr. Watters for special catheter placement possibly next week.  

Patient was able to tolerate hemodialysis yesterday and today.


Patient reported history of diabetes on glyburide and starlix.  However her 

sugars were low in the hospital and these medications were held.  A1c level was 

only 4.3.  We recommended to discontinue glyburide and starlix and cover with 

insulin ss.


Continue with home medications for HIV and hypertension.





Patient is discharged today to NH.


Follow up with IR next week.


Diabetic medications held as above.





Daniel Franklin MD


Hospitalist.

## 2017-11-25 NOTE — CP.PCM.PN
Subjective





- Date & Time of Evaluation


Date of Evaluation: 11/25/17





Objective





- Vital Signs/Intake and Output


Vital Signs (last 24 hours): 


 











Temp Pulse Resp BP Pulse Ox


 


 99.1 F   78   19   118/57 L  98 


 


 11/25/17 00:01  11/25/17 02:00  11/25/17 00:01  11/25/17 00:01  11/25/17 00:01











- Medications


Medications: 


 Current Medications





Clopidogrel Bisulfate (Plavix)  75 mg PO DAILY [facility]


   Last Admin: 11/24/17 10:00 Dose:  75 mg


Glyburide (Micronase)  1.25 mg PO BRKDIN [facility]


   Last Admin: 11/24/17 07:50 Dose:  Not Given


Heparin Sodium (Porcine) (Heparin)  4,000 units IVP TTS [facility]


   Last Admin: 11/23/17 11:30 Dose:  4,000 units


Isosorbide Mononitrate (Imdur)  60 mg PO ACB [facility]


   Last Admin: 11/24/17 08:07 Dose:  Not Given


Lopinavir/Ritonavir (Kaletra 200-50 Mg)  2 cap PO BID [facility]


   Last Admin: 11/24/17 17:33 Dose:  2 cap


Metoprolol Tartrate (Lopressor)  25 mg PO BRKDIN [facility]


   Last Admin: 11/24/17 17:33 Dose:  25 mg


Nateglinide (Starlix)  60 mg PO AC [facility]


   Last Admin: 11/24/17 07:50 Dose:  Not Given


Pantoprazole Sodium (Protonix Ec Tab)  40 mg PO 0600 [facility]


   Last Admin: 11/24/17 08:20 Dose:  40 mg


Raltegravir (Isentress)  400 mg PO BID [facility]


   Last Admin: 11/24/17 17:33 Dose:  400 mg


Sevelamer HCl (Renagel)  800 mg PO TID [facility]


   Last Admin: 11/24/17 17:33 Dose:  800 mg


Vitamin B Complex/Vit C/Folic Acid (Nephro-Dee)  1 tab PO 0800 [facility]


   Last Admin: 11/24/17 08:20 Dose:  1 tab











- Labs


Labs: 


 





 11/24/17 06:30 





 11/24/17 06:30 





 











PT  10.9 SECONDS (9.4-12.5)   11/22/17  20:09    


 


INR  1.00  (0.93-1.08)   11/22/17  20:09    


 


APTT  31.7 Seconds (25.1-36.5)   11/22/17  20:09

## 2017-12-07 ENCOUNTER — HOSPITAL ENCOUNTER (OUTPATIENT)
Dept: HOSPITAL 42 - SDSVAS | Age: 57
Discharge: ANOTHER HOSPITAL | End: 2017-12-07
Attending: RADIOLOGY
Payer: MEDICARE

## 2017-12-07 VITALS
SYSTOLIC BLOOD PRESSURE: 142 MMHG | OXYGEN SATURATION: 97 % | RESPIRATION RATE: 18 BRPM | DIASTOLIC BLOOD PRESSURE: 64 MMHG

## 2017-12-07 VITALS — TEMPERATURE: 98.2 F

## 2017-12-07 VITALS — BODY MASS INDEX: 28.3 KG/M2

## 2017-12-07 VITALS — HEART RATE: 59 BPM

## 2017-12-07 DIAGNOSIS — E11.22: ICD-10-CM

## 2017-12-07 DIAGNOSIS — T82.41XA: Primary | ICD-10-CM

## 2017-12-07 DIAGNOSIS — I12.0: ICD-10-CM
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
## 2017-12-07 DIAGNOSIS — Z99.2: ICD-10-CM

## 2017-12-07 DIAGNOSIS — N18.6: ICD-10-CM

## 2017-12-07 DIAGNOSIS — Z79.4: ICD-10-CM

## 2017-12-07 LAB
APTT BLD: 34.7 SECONDS (ref 25.1–36.5)
BASOPHILS # BLD AUTO: 0.03 K/MM3 (ref 0–2)
BASOPHILS NFR BLD: 0.7 % (ref 0–3)
BUN SERPL-MCNC: 31 MG/DL (ref 7–21)
CALCIUM SERPL-MCNC: 10.7 MG/DL (ref 8.4–10.5)
CHLORIDE SERPL-SCNC: 99 MMOL/L (ref 98–107)
CO2 SERPL-SCNC: 29 MMOL/L (ref 21–33)
EOSINOPHIL # BLD: 0.3 10*3/UL (ref 0–0.7)
EOSINOPHIL NFR BLD: 7.2 % (ref 1.5–5)
ERYTHROCYTE [DISTWIDTH] IN BLOOD BY AUTOMATED COUNT: 19.8 % (ref 11.5–14.5)
GLUCOSE SERPL-MCNC: 82 MG/DL (ref 70–110)
GRANULOCYTES # BLD: 2.43 10*3/UL (ref 1.4–6.5)
GRANULOCYTES NFR BLD: 56.1 % (ref 50–68)
HCT VFR BLD CALC: 35.1 % (ref 36–48)
INR PPP: 0.96 (ref 0.93–1.08)
LYMPHOCYTES # BLD: 1.4 10*3/UL (ref 1.2–3.4)
LYMPHOCYTES NFR BLD AUTO: 31.2 % (ref 22–35)
MCH RBC QN AUTO: 29.9 PG (ref 25–35)
MCHC RBC AUTO-ENTMCNC: 30.8 G/DL (ref 31–37)
MCV RBC AUTO: 97.2 FL (ref 80–105)
MONOCYTES # BLD AUTO: 0.2 10*3/UL (ref 0.1–0.6)
MONOCYTES NFR BLD: 4.8 % (ref 1–6)
PLATELET # BLD: 203 10^3/UL (ref 120–450)
PMV BLD AUTO: 10.7 FL (ref 7–11)
POTASSIUM SERPL-SCNC: 5.5 MMOL/L (ref 3.6–5)
SODIUM SERPL-SCNC: 141 MMOL/L (ref 132–148)
WBC # BLD AUTO: 4.3 10^3/UL (ref 4.5–11)

## 2017-12-07 PROCEDURE — 80048 BASIC METABOLIC PNL TOTAL CA: CPT

## 2017-12-07 PROCEDURE — 85025 COMPLETE CBC W/AUTO DIFF WBC: CPT

## 2017-12-07 PROCEDURE — 99153 MOD SED SAME PHYS/QHP EA: CPT

## 2017-12-07 PROCEDURE — 85610 PROTHROMBIN TIME: CPT

## 2017-12-07 PROCEDURE — 36589 REMOVAL TUNNELED CV CATH: CPT

## 2017-12-07 PROCEDURE — 36415 COLL VENOUS BLD VENIPUNCTURE: CPT

## 2017-12-07 PROCEDURE — 99152 MOD SED SAME PHYS/QHP 5/>YRS: CPT

## 2017-12-07 PROCEDURE — 36581 REPLACE TUNNELED CV CATH: CPT

## 2017-12-07 PROCEDURE — 85730 THROMBOPLASTIN TIME PARTIAL: CPT

## 2017-12-07 NOTE — VASCULAR
PROCEDURE:  Replace tunneled right subclavian dialysis catheter.



Remove tunneled trans lumbar dialysis catheter 



CLINICAL HISTORY:  End stage renal disease. Very limited venous 

access.  Malfunctioning trans lumbar dialysis catheter. Replace 

malfunctioning right subclavian dialysis catheter 



PHYSICIAN(S):  Kodi Watters M.D.



TECHNIQUE:

The relative risks and indications for the procedure were explained 

to the patient and consent obtained. The patient was placed supine on 

the arteriogram table and the tunneled right subclavian dialysis 

catheter prepped and draped in the usual sterile fashion. Antibiotics 

were given prior to the procedure. 1% Xylocaine was used to 

anesthetize the skin soft tissues at the vein insertion site. A 2 cm 

incision was performed and the catheter bluntly dissected. Both ends 

of the catheter controlled and the catheter transected. The cuff and 

soft tissue portion of the catheter were anesthetized with 1% 

Xylocaine. Blunt dissection was performed. The cuffed portion of the 

catheter was removed.



A 0.035 angled Glidewire was advanced through the catheter fragment 

and placed in the IVC. The old catheter was removed. The sheath was 

placed for the new catheter insertion. A 27cm Nextgen catheter was 

advanced with its tip in the right atrium. A new retrograde tunnel 

below right  clavicle was performed. The catheter was trimmed and the 

hub attached. The catheter was secured. The incision was closed with 

interrupted sutures.



The right translumbar tunneled catheter was prepped and draped usual 

sterile fashion. 1 percent xylocaine was used to anesthetize skin 

soft tissues.  The catheter was bluntly dissected and removed.  A 

single interrupted suture was placed at the exit site. 



The patient tolerated the procedure well. 



FINDINGS:



IMPRESSION:

1. Replacement of the patient's tunneled right subclavian dialysis 

catheter. A 27cm Nextgen catheter was placed with its tip in the 

right atrium. 



2. Removal the patient's malfunctioning tunneled right trans lumbar 

dialysis catheter

## 2018-04-19 ENCOUNTER — HOSPITAL ENCOUNTER (EMERGENCY)
Dept: HOSPITAL 42 - ED | Age: 58
Discharge: LEFT BEFORE DISCHARGE | End: 2018-04-19
Payer: MEDICARE

## 2018-04-19 VITALS — HEART RATE: 80 BPM | OXYGEN SATURATION: 100 % | SYSTOLIC BLOOD PRESSURE: 122 MMHG | DIASTOLIC BLOOD PRESSURE: 74 MMHG

## 2018-04-19 VITALS — TEMPERATURE: 98.1 F

## 2018-04-19 VITALS — BODY MASS INDEX: 29.9 KG/M2

## 2018-04-19 VITALS — RESPIRATION RATE: 18 BRPM

## 2018-04-19 DIAGNOSIS — I12.0: ICD-10-CM

## 2018-04-19 DIAGNOSIS — Z99.2: ICD-10-CM

## 2018-04-19 DIAGNOSIS — N18.6: ICD-10-CM

## 2018-04-19 DIAGNOSIS — E11.22: ICD-10-CM

## 2018-04-19 DIAGNOSIS — R10.9: Primary | ICD-10-CM

## 2018-04-19 DIAGNOSIS — Z87.891: ICD-10-CM

## 2018-04-19 LAB
ALBUMIN SERPL-MCNC: 4.7 G/DL (ref 3–4.8)
ALBUMIN/GLOB SERPL: 1 {RATIO} (ref 1.1–1.8)
ALT SERPL-CCNC: 17 U/L (ref 7–56)
AST SERPL-CCNC: 25 U/L (ref 14–36)
BASOPHILS # BLD AUTO: 0.01 K/MM3 (ref 0–2)
BASOPHILS NFR BLD: 0.3 % (ref 0–3)
BUN SERPL-MCNC: 34 MG/DL (ref 7–21)
CALCIUM SERPL-MCNC: 9.4 MG/DL (ref 8.4–10.5)
EOSINOPHIL # BLD: 0.1 10*3/UL (ref 0–0.7)
EOSINOPHIL NFR BLD: 1.3 % (ref 1.5–5)
ERYTHROCYTE [DISTWIDTH] IN BLOOD BY AUTOMATED COUNT: 20.6 % (ref 11.5–14.5)
GFR NON-AFRICAN AMERICAN: 8
GRANULOCYTES # BLD: 2.6 10*3/UL (ref 1.4–6.5)
GRANULOCYTES NFR BLD: 65.2 % (ref 50–68)
HGB BLD-MCNC: 13.7 G/DL (ref 12–16)
LIPASE SERPL-CCNC: 117 U/L (ref 23–300)
LYMPHOCYTES # BLD: 1.1 10*3/UL (ref 1.2–3.4)
LYMPHOCYTES NFR BLD AUTO: 27.4 % (ref 22–35)
MCH RBC QN AUTO: 30.9 PG (ref 25–35)
MCHC RBC AUTO-ENTMCNC: 31.9 G/DL (ref 31–37)
MCV RBC AUTO: 96.8 FL (ref 80–105)
MONOCYTES # BLD AUTO: 0.2 10*3/UL (ref 0.1–0.6)
MONOCYTES NFR BLD: 5.8 % (ref 1–6)
PLATELET # BLD: 137 10^3/UL (ref 120–450)
PMV BLD AUTO: 9.8 FL (ref 7–11)
RBC # BLD AUTO: 4.44 10^6/UL (ref 3.5–6.1)
WBC # BLD AUTO: 4 10^3/UL (ref 4.5–11)

## 2018-04-19 NOTE — CP.PCM.CON
History of Present Illness





- History of Present Illness


History of Present Illness: 


Surgery: Dr. Valdez 





Pt. is a 57 y.o AA female w/ a PMH of DM, HTN, ERSD w/ dialysis on M/W/F (last 

full treatment yesterday), cholecystitis, s/p cholecystectomy (10 yrs ago), 

right chest shunt, right BKA and HIV with last CD4 count unknown, who presents 

to the ED with complaint of abdominal pain that began earlier on today. Pt. 

states pain is sharp and is specifically located in the epigastric region. Pt. 

denies that pain is diffuse or radiates. Pt. denies any exacerbating or 

remediating factors of her current symptoms.  Pt. denied fevers, chills, 

headache, nausea, vomiting, diarrhea and constipation. 





PMH- as stated in HPI 


PSH- Cholecystectomy (10 years ago). R BKA amputation 


SH- Denies use of tobacco and alcohol. Past smoker, states she quit 11-12 yrs 

ago. 


Allergies- NKA








Past Patient History





- Infectious Disease


Hx of Infectious Diseases: None





- Past Medical History & Family History


Past Medical History?: Yes





- Past Social History


Smoking Status: Former Smoker





- CARDIAC


Hx Congestive Heart Failure: Yes


Hx Hypertension: Yes





- PULMONARY


Hx Respiratory Disorders: No





- NEUROLOGICAL


Hx Dementia: Yes





- HEENT


Hx HEENT Problems: Yes (wears glasses)


Hx Glaucoma: Yes





- RENAL


Hx Chronic Kidney Disease: Yes


Hx Kidney Stones: No





- ENDOCRINE/METABOLIC


Hx Hyperthyroidism: Yes


Hx Hypothyroidism: Yes





- HEMATOLOGICAL/ONCOLOGICAL


Hx Blood Transfusions:  (UNKNOWN)





- INTEGUMENTARY


Hx Dermatological Problems: No





- MUSCULOSKELETAL/RHEUMATOLOGICAL


Hx Musculoskeletal Disorders: Yes (Right BKA)





- GASTROINTESTINAL


Hx Gastrointestinal Disorders: No





- GENITOURINARY/GYNECOLOGICAL


Hx Genitourinary Disorders: Yes


Other/Comment: oliguria, on hemodialysis MWF





- PSYCHIATRIC


Hx Anxiety: Yes


Hx Substance Use: No





- SURGICAL HISTORY


Hx Amputation: Yes (Right BKA)





- ANESTHESIA


Hx Anesthesia Reactions: No


Hx Malignant Hyperthermia: No





Meds


Allergies/Adverse Reactions: 


 Allergies











Allergy/AdvReac Type Severity Reaction Status Date / Time


 


No Known Allergies Allergy   Verified 04/19/18 11:49














Physical Exam





- Constitutional


Appears: No Acute Distress





- Head Exam


Head Exam: ATRAUMATIC, NORMOCEPHALIC





- GI/Abdominal Exam


GI & Abdominal Exam: Soft (Significant tenderness in epigastric region ), 

Tenderness





- Neurological Exam


Neurological exam: Alert, Oriented x3





Results





- Vital Signs


Recent Vital Signs: 


 Last Vital Signs











Temp  98.2 F   04/19/18 12:01


 


Pulse  75   04/19/18 15:47


 


Resp  18   04/19/18 15:47


 


BP  118/63   04/19/18 15:47


 


Pulse Ox  96   04/19/18 15:47














- Labs


Result Diagrams: 


 04/19/18 14:00





 04/19/18 14:00


Labs: 


 Laboratory Results - last 24 hr











  04/19/18 04/19/18 04/19/18





  12:16 14:00 14:00


 


WBC   4.0 L 


 


RBC   4.44 


 


Hgb   13.7  D 


 


Hct   43.0 


 


MCV   96.8 


 


MCH   30.9 


 


MCHC   31.9 


 


RDW   20.6 H 


 


Plt Count   137 


 


MPV   9.8 


 


Gran %   65.2 


 


Lymph % (Auto)   27.4 


 


Mono % (Auto)   5.8 


 


Eos % (Auto)   1.3 L 


 


Baso % (Auto)   0.3 


 


Gran #   2.60 


 


Lymph # (Auto)   1.1 L 


 


Mono # (Auto)   0.2 


 


Eos # (Auto)   0.1 


 


Baso # (Auto)   0.01 


 


Sodium    139


 


Potassium    4.2


 


Chloride    92 L


 


Carbon Dioxide    31


 


Anion Gap    21 H


 


BUN    34 H


 


Creatinine    5.4 H


 


Est GFR ( Amer)    10


 


Est GFR (Non-Af Amer)    8


 


POC Glucose (mg/dL)  121 H  


 


Random Glucose    111 H


 


Calcium    9.4


 


Total Bilirubin    0.4


 


AST    25


 


ALT    17


 


Alkaline Phosphatase    197 H D


 


Total Protein    9.4 H


 


Albumin    4.7


 


Globulin    4.6


 


Albumin/Globulin Ratio    1.0 L


 


Lipase    117














Assessment & Plan





- Assessment and Plan (Free Text)


Assessment: 





57y.o female with epigastric pain. 


Plan: 





- Recommend CT scan w/ contrast


- Consider extra hemodialysis after imaging 


- Will advice further recommendations pending imaging results 


- Will continue to follow 





Will discuss with Dr. Valdez.

## 2018-04-19 NOTE — ED PDOC
Arrival/HPI





- General


Time Seen by Provider: 04/19/18 11:32


Historian: Patient





- History of Present Illness


Narrative History of Present Illness (Text): 


04/19/18 11:58


A 57 year old female, whose past medical history includes diabetes, hypertension

, ESRD with hemodialysis on M/W/F (last full treatment received yesterday), 

right chest shunt, right BKA, and HIV with last CD4 count unknown, presents to 

the emergency department complaining of abdominal pain today. Patient notes 

taking Pepto-Bismol and Maalox, with no relief. Patient secondarily complains 

of a wound to right back from prior catheter placement. She notes her nurse 

from dialysis noted mild bleeding and pus a few days ago. Patient denies any 

fever, chills, nausea, vomiting, diarrhea, chest pain, shortness of breath or 

ay other complaints. 





Time/Duration: Other (today)


Symptom Course: Unchanged


Context: Home





Past Medical History





- Provider Review


Nursing Documentation Reviewed: Yes





- Infectious Disease


Hx of Infectious Diseases: None





- Cardiac


Hx Congestive Heart Failure: Yes


Hx Hypertension: Yes





- Pulmonary


Hx Respiratory Disorders: No





- Neurological


Hx Dementia: Yes





- HEENT


Hx HEENT Disorder: Yes (wears glasses)


Hx Glaucoma: Yes





- Renal


Hx Renal Disorder: Yes


Hx Kidney Stones: No





- Endocrine/Metabolic


Hx Hyperthyroidism: Yes


Hx Hypothyroidism: Yes





- Hematological/Oncological


Hx Blood Transfusions:  (UNKNOWN)





- Integumentary


Hx Dermatological Disorder: No





- Musculoskeletal/Rheumatological


Hx Musculoskeletal Disorders: Yes (Right BKA)





- Gastrointestinal


Hx Gastrointestinal Disorders: No





- Genitourinary/Gynecological


Hx Genitourinary Disorders: Yes


Other/Comment: oliguria, on hemodialysis MWF





- Psychiatric


Hx Anxiety: Yes


Hx Substance Use: No





- Surgical History


Hx Amputation: Yes (Right BKA)





- Anesthesia


Hx Anesthesia Reactions: No


Hx Malignant Hyperthermia: No





- Suicidal Assessment


Feels Threatened In Home Enviroment: No





Family/Social History





- Physician Review


Nursing Documentation Reviewed: Yes


Family/Social History: No Known Family HX


Smoking Status: Former Smoker


Hx Alcohol Use: No


Hx Substance Use: No





Allergies/Home Meds


Allergies/Adverse Reactions: 


Allergies





No Known Allergies Allergy (Verified 04/19/18 11:49)


 








Home Medications: 


 Home Meds











 Medication  Instructions  Recorded  Confirmed


 


Brimonidine Tartrate/Timolol 1 drop OP Q12H 04/14/14 04/19/18





[Combigan 0.2%-0.5% Eye Drops]   


 


Emtricitabine/Tenofovir Diso 1 tab PO DAILY 11/17/17 04/19/18





[Truvada 200 MG-300 MG]   


 


Menthol/Zinc Oxide [Calmoseptine 1 applic TP Q8H 11/17/17 04/19/18





Ointment]   














Review of Systems





- Physician Review


All systems were reviewed & negative as marked: Yes





- Review of Systems


Constitutional: absent: Fevers, Night Sweats


Respiratory: absent: SOB


Cardiovascular: absent: Chest Pain


Gastrointestinal: Abdominal Pain.  absent: Diarrhea, Nausea, Vomiting


Skin: Other (wound to right back)





Physical Exam


Vital Signs Reviewed: Yes


Vital Signs











  Temp Pulse Resp BP Pulse Ox


 


 04/19/18 19:35   80  18  122/74  100


 


 04/19/18 18:09  98.1 F  84  18  119/78  98


 


 04/19/18 15:47   75  18  118/63  96


 


 04/19/18 13:32   79  18  122/65  96


 


 04/19/18 12:01  98.2 F  85  18  124/62  96











Temperature: Afebrile


Blood Pressure: Normal


Pulse: Regular


Respiratory Rate: Normal


Appearance: Positive for: Well-Appearing, Non-Toxic, Comfortable


Pain Distress: None


Mental Status: Positive for: Alert and Oriented X 3





- Systems Exam


Head: Present: Atraumatic, Normocephalic


Pupils: Present: PERRL


Extroacular Muscles: Present: EOMI


Conjunctiva: Present: Normal


Mouth: Present: Moist Mucous Membranes


Neck: Present: Normal Range of Motion


Respiratory/Chest: Present: Clear to Auscultation, Good Air Exchange.  No: 

Respiratory Distress, Accessory Muscle Use


Cardiovascular: Present: Regular Rate and Rhythm, Normal S1, S2.  No: Murmurs


Abdomen: Present: Tenderness (Epigastric tenderness to palpation), Normal Bowel 

Sounds, Guarding.  No: Distention, Peritoneal Signs, Rebound


Back: Present: Normal Inspection


Upper Extremity: Present: Normal Inspection.  No: Cyanosis, Edema


Lower Extremity: Present: Edema (trace edema to left lower extremity), NORMAL 

PULSES, Neurovascularly Intact, Other (Right BKA, 4x2 ulcer to left heel ).  No

: CALF TENDERNESS, Tenderness, Temperature Abnormalties


Neurological: Present: GCS=15, CN II-XII Intact, Speech Normal


Skin: Present: Warm, Dry, Normal Color, Other (well healing wound to right back 

with trace fluid, no bleeding or fluctuance).  No: Rashes


Psychiatric: Present: Alert, Oriented x 3, Normal Insight, Normal Concentration





Medical Decision Making


ED Course and Treatment: 


04/19/18 11:58


Impression:


A 57 year old female with abdominal pain. Patient notes well healing wound to 

right back.





Differential Diagnosis included but are not limited to: Gastritis vs. 

Pancreatitis





Plan:


-- Abdomen and pelvis CT


-- Labs


-- Maalox, Donnatal elixir, Pepcid and Lidocaine 


-- Reassess and disposition





Progress Notes:


Report Date : 04/19/2018 14:34:36


PROCEDURE:  CT Abdomen and Pelvis without intravenous contrast


Dictator : Avinash Fu MD


IMPRESSION:


1. Although a cirrhotic liver is not clearly evident given smooth peripheral 

appearance, portal venous obstruction is is suggested based on recanalization 

of umbilical vein and associated varices. Inferior vena cava appears small. 


2. Prior cholecystectomy. 


3. Small lucency upper pole left kidney stone too small to characterize.  Lack 

images contrast further limits the interpretation.  No obstructive uropathy 

bilaterally.  Bilateral renal cortical atrophy noted. 


4. Limited anterior wedge compression fracture T12, indeterminate age.





04/19/18 15:30


On re-evaluation, patient feels better and is resting comfortably. States her 

abdominal pain has improved after medication. The wound on her right back does 

not appear infected. It's healing properly. 





04/19/18 17:28


Case discussed with Dr. Valdez, who recommends GI consult. States no surgical 

intervention needed at this time. 


GI on call Dr. Preston brown, awaiting call backs.





04/19/18 17:58


Patient reports she does not want to stay in the hospital and would like to go 

home. The patient is choosing to leave against medical advice.  I have 

personally explained to the patient that choosing to do so may result in 

permanent bodily harm or death.  I have discussed at great length that without 

further evaluation and monitoring there may be unforeseen circumstances and/or 

deterioration causing permanent bodily harm or death as a result of their 

choice. The patient is alert, oriented, and shows the mental capacity to make 

clear decisions regarding the patients health care at this time. The patient 

continues to wish to leave against medical advice. In light of the patients 

decision to leave against medical advice, follow-up has been arranged and the 

patient is aware of the importance to following up as instructed.  The patient 

has been advised that they should return to the emergency room immediately if 

they change their mind at any time, or if their condition begins to change or 

worsen in any way.








- Lab Interpretations


Lab Results: 








 04/19/18 14:00 





 04/19/18 14:00 





 Lab Results





04/19/18 14:00: Sodium 139, Potassium 4.2, Chloride 92 L, Carbon Dioxide 31, 

Anion Gap 21 H, BUN 34 H, Creatinine 5.4 H, Est GFR ( Amer) 10, Est GFR (

Non-Af Amer) 8, Random Glucose 111 H, Calcium 9.4, Total Bilirubin 0.4, AST 25, 

ALT 17, Alkaline Phosphatase 197 H D, Total Protein 9.4 H, Albumin 4.7, 

Globulin 4.6, Albumin/Globulin Ratio 1.0 L, Lipase 117


04/19/18 14:00: WBC 4.0 L, RBC 4.44, Hgb 13.7  D, Hct 43.0, MCV 96.8, MCH 30.9, 

MCHC 31.9, RDW 20.6 H, Plt Count 137, MPV 9.8, Gran % 65.2, Lymph % (Auto) 27.4

, Mono % (Auto) 5.8, Eos % (Auto) 1.3 L, Baso % (Auto) 0.3, Gran # 2.60, Lymph 

# (Auto) 1.1 L, Mono # (Auto) 0.2, Eos # (Auto) 0.1, Baso # (Auto) 0.01


04/19/18 12:16: POC Glucose (mg/dL) 121 H








I have reviewed the lab results: Yes





- RAD Interpretation


Radiology Orders: 








04/19/18 11:55


ABD & PELVIS W/O PO OR IV CONT [CT] Stat 














- Medication Orders


Current Medication Orders: 











Discontinued Medications





Al Hydrox/Mg Hydrox/Simethicone (Maalox Plus 30 Ml)  30 ml PO STAT STA


   Stop: 04/19/18 11:57


   Last Admin: 04/19/18 12:40  Dose: 30 ml





Belladonna/Phenobarbital (Donnatal Elixir)  5 ml PO STAT STA


   Stop: 04/19/18 11:57


   Last Admin: 04/19/18 12:50  Dose: 5 ml





Famotidine (Pepcid)  20 mg IVP STAT STA


   Stop: 04/19/18 11:56


   Last Admin: 04/19/18 12:40  Dose: 20 mg





IVP Administration


 Document     04/19/18 12:40  SF  (Rec: 04/19/18 12:40  SF  [id number]


     Charges for Administration


      # of IVP Administrations                   1





Lidocaine HCl (Lidocaine 2% Viscous)  15 ml PO STAT STA


   Stop: 04/19/18 11:57


   Last Admin: 04/19/18 12:40  Dose: 15 ml











- Scribe Statement


The provider has reviewed the documentation as recorded by the Aliceibe


Ronda Rogers





Provider Scribe Attestation:


All medical record entries made by the Scribe were at my direction and 

personally dictated by me. I have reviewed the chart and agree that the record 

accurately reflects my personal performance of the history, physical exam, 

medical decision making, and the department course for this patient. I have 

also personally directed, reviewed, and agree with the discharge instructions 

and disposition.








Disposition/Present on Arrival





- Present on Arrival


Any Indicators Present on Arrival: No


History of DVT/PE: No


History of Uncontrolled Diabetes: No


Urinary Catheter: No


History Surgical Site Infection Following: None





- Disposition


Have Diagnosis and Disposition been Completed?: Yes


Diagnosis: 


 Abdominal pain





Disposition: AGAINST MEDICAL ADVICE


Disposition Time: 19:57


Patient Plan: Discharge


Condition: GOOD


Discharge Instructions (ExitCare):  Acute Abdomen (Belly Pain), Adult (DC)


Additional Instructions: 





Ms Jeff, thank you for letting us take care of you today. Your provider was 

Dr. Gibson You were treated for Abdominal Pain. The emergency medical care you 

received today was directed at your acute symptoms. If you were prescribed any 

medication, please fill it and take as directed. It may take several days for 

your symptoms to resolve. Return to the Emergency Department if your symptoms 

worsen, do not improve, or if you have any other problems.





Please contact your doctor or call one of the physicians/clinics you have been 

referred to that are listed on the Patient Visit Information form that is 

included in your discharge packet. Bring any paperwork you were given at 

discharge with you along with any medications you are taking to your follow up 

visit. Our treatment cannot replace ongoing medical care by a primary care 

provider (PCP) outside of the emergency department.





Thank you for allowing the [facility] team to be part of your care today.








If you had an X-Ray or CT scan: A Radiologist will review the ED reading if any 

change in treatment is needed we will contact you.***





If you had a blood, urine, or wound culture: It will take several days for the 

results, if any change in treatment is needed we will contact you.***





If you had an STI test: It will take 48 hours for the results. Please call 

after 1 week if you have not heard back.***


Prescriptions: 


Ranitidine HCl [Zantac] 150 mg PO BID PRN #30 tablet


 PRN Reason: Pain, Mild (1-3)


Referrals: 


[facility], [Family Provider] - Follow up with primary


Forms:  [email or web address] (English)

## 2018-04-19 NOTE — CT
PROCEDURE:  CT Abdomen and Pelvis without intravenous contrast



HISTORY:

abd pain



COMPARISON:

None.



TECHNIQUE:

Helical CT of the abdomen and pelvis was performed without oral or 

intravenous contrast as per referring physician request. 



Contrast Dose: None



Radiation dose: 



Total exam DLP = 925.18 mGy-cm.



This CT exam was performed using one or more of the following dose 

reduction techniques: Automated exposure control, adjustment of the 

mA and/or kV according to patient size, and/or use of iterative 

reconstruction technique.



FINDINGS:



LOWER THORAX:

Cardiomegaly which trace anterior pericardial effusion inferiorly. No 

pleural effusion bilaterally.  Motion artifacts from respirations 

obscure the exam somewhat. 



LIVER:

Unremarkable. No gross lesion or ductal dilatation.  



GALLBLADDER AND BILE DUCTS:

Prior cholecystectomy evident. 



PANCREAS:

Unremarkable. No gross lesion or ductal dilatation.



SPLEEN:

Unremarkable. 



ADRENALS:

Unremarkable. No mass. 



KIDNEYS AND URETERS:

No obstructive uropathy bilaterally.  Bilateral renal cortical 

atrophy is appreciated.  Small lucency is seen at the upper midpole 

left kidney anteriorly, too small to characterize.  Vascular 

calcifications are identified at the hilar regions bilaterally. 



VASCULATURE:

The inferior vena cava appears quite small and there is an apparent 

recannulized umbilical vein joining with the varix at the anterior 

abdominal wall extending inferiorly into the left inguinal no 

vascular sheath. Consider significant portal venous obstruction. An 

atherosclerotic but nonaneurysmal abdominal aorta is appreciated. 



BOWEL:

The stomach is mildly distended with retained fluid. The bowel is not 

obstructed. Mildly prominent amount retained fecal material scattered 

throughout the colon reflecting likely constipation.



APPENDIX:

Unremarkable. Normal appendix. 



PERITONEUM:

Unremarkable. No free fluid. No free air. 



LYMPH NODES:

There are a few mildly enlarged inguinal lymph nodes bilaterally 

including 1.8 x 1.1 cm right inguinal lymph node and a 1.4 x 0.8 cm 

left inguinal lymph node. 



BLADDER:

Urinary bladder appears decompressed. 



REPRODUCTIVE:

Unremarkable. 



BONES:

Large Schmorl's nodes affects the superior and inferior endplate at 

L3, superior endplate of L1 and and moderate Schmorl's node affects 

the superior endplate of L2. Limited anterior wedge compression 

fracture of the T12 vertebral body is suggested anteriorly. 



OTHER FINDINGS:

None.



IMPRESSION:

1. Although a cirrhotic liver is not clearly evident given smooth 

peripheral appearance, portal venous obstruction is is suggested 

based on recanalization of umbilical vein and associated varices. 

Inferior vena cava appears small. 



2. Prior cholecystectomy. 



3. Small lucency upper pole left kidney stone too small to 

characterize.  Lack images contrast further limits the 

interpretation.  No obstructive uropathy bilaterally.  Bilateral 

renal cortical atrophy noted. 



4. Limited anterior wedge compression fracture T12, indeterminate 

age.

## 2018-05-03 ENCOUNTER — HOSPITAL ENCOUNTER (INPATIENT)
Dept: HOSPITAL 31 - C.ER | Age: 58
LOS: 3 days | Discharge: HOME | DRG: 270 | End: 2018-05-06
Attending: FAMILY MEDICINE | Admitting: FAMILY MEDICINE
Payer: MEDICARE

## 2018-05-03 VITALS — BODY MASS INDEX: 29.9 KG/M2

## 2018-05-03 DIAGNOSIS — L97.429: ICD-10-CM

## 2018-05-03 DIAGNOSIS — E11.51: Primary | ICD-10-CM

## 2018-05-03 DIAGNOSIS — B20: ICD-10-CM

## 2018-05-03 DIAGNOSIS — E11.621: ICD-10-CM

## 2018-05-03 DIAGNOSIS — I13.2: ICD-10-CM

## 2018-05-03 DIAGNOSIS — Z79.4: ICD-10-CM

## 2018-05-03 DIAGNOSIS — E11.22: ICD-10-CM

## 2018-05-03 DIAGNOSIS — Z87.891: ICD-10-CM

## 2018-05-03 DIAGNOSIS — D63.1: ICD-10-CM

## 2018-05-03 DIAGNOSIS — F03.90: ICD-10-CM

## 2018-05-03 DIAGNOSIS — E03.9: ICD-10-CM

## 2018-05-03 DIAGNOSIS — E78.5: ICD-10-CM

## 2018-05-03 DIAGNOSIS — I50.9: ICD-10-CM

## 2018-05-03 DIAGNOSIS — Z89.511: ICD-10-CM

## 2018-05-03 DIAGNOSIS — N18.6: ICD-10-CM

## 2018-05-03 DIAGNOSIS — Z99.2: ICD-10-CM

## 2018-05-03 LAB
ALBUMIN SERPL-MCNC: 3.9 G/DL (ref 3.5–5)
ALBUMIN/GLOB SERPL: 0.7 {RATIO} (ref 1–2.1)
ALT SERPL-CCNC: < 6 U/L (ref 9–52)
APTT BLD: 34 SECONDS (ref 21–34)
AST SERPL-CCNC: 20 U/L (ref 14–36)
BASOPHILS # BLD AUTO: 0 K/UL (ref 0–0.2)
BASOPHILS NFR BLD: 0.4 % (ref 0–2)
BUN SERPL-MCNC: 36 MG/DL (ref 7–17)
CALCIUM SERPL-MCNC: 9.5 MG/DL (ref 8.6–10.4)
EOSINOPHIL # BLD AUTO: 0 K/UL (ref 0–0.7)
EOSINOPHIL NFR BLD: 0.7 % (ref 0–4)
ERYTHROCYTE [DISTWIDTH] IN BLOOD BY AUTOMATED COUNT: 21.2 % (ref 11.5–14.5)
GFR NON-AFRICAN AMERICAN: 8
HGB BLD-MCNC: 12.5 G/DL (ref 11–16)
INR PPP: 1
LYMPHOCYTES # BLD AUTO: 0.9 K/UL (ref 1–4.3)
LYMPHOCYTES NFR BLD AUTO: 13.1 % (ref 20–40)
MCH RBC QN AUTO: 32 PG (ref 27–31)
MCHC RBC AUTO-ENTMCNC: 33.5 G/DL (ref 33–37)
MCV RBC AUTO: 95.3 FL (ref 81–99)
MONOCYTES # BLD: 0.5 K/UL (ref 0–0.8)
MONOCYTES NFR BLD: 6.5 % (ref 0–10)
NEUTROPHILS # BLD: 5.5 K/UL (ref 1.8–7)
NEUTROPHILS NFR BLD AUTO: 79.3 % (ref 50–75)
NRBC BLD AUTO-RTO: 0.1 % (ref 0–2)
PLATELET # BLD: 201 K/UL (ref 130–400)
PMV BLD AUTO: 7.9 FL (ref 7.2–11.7)
PROTHROMBIN TIME: 11.7 SECONDS (ref 9.7–12.2)
RBC # BLD AUTO: 3.9 MIL/UL (ref 3.8–5.2)
WBC # BLD AUTO: 7 K/UL (ref 4.8–10.8)

## 2018-05-03 RX ADMIN — HUMAN INSULIN SCH: 100 INJECTION, SOLUTION SUBCUTANEOUS at 22:55

## 2018-05-03 RX ADMIN — TAZOBACTAM SODIUM AND PIPERACILLIN SODIUM SCH MLS/HR: 250; 2 INJECTION, SOLUTION INTRAVENOUS at 17:25

## 2018-05-04 LAB
ALBUMIN SERPL-MCNC: 4 G/DL (ref 3.5–5)
ALBUMIN/GLOB SERPL: 0.7 {RATIO} (ref 1–2.1)
ALT SERPL-CCNC: 145 U/L (ref 9–52)
APTT BLD: 33 SECONDS (ref 21–34)
AST SERPL-CCNC: 308 U/L (ref 14–36)
BASOPHILS # BLD AUTO: 0 K/UL (ref 0–0.2)
BASOPHILS NFR BLD: 0.6 % (ref 0–2)
BUN SERPL-MCNC: 46 MG/DL (ref 7–17)
CALCIUM SERPL-MCNC: 9.4 MG/DL (ref 8.6–10.4)
EOSINOPHIL # BLD AUTO: 0.1 K/UL (ref 0–0.7)
EOSINOPHIL NFR BLD: 1 % (ref 0–4)
ERYTHROCYTE [DISTWIDTH] IN BLOOD BY AUTOMATED COUNT: 21.2 % (ref 11.5–14.5)
GFR NON-AFRICAN AMERICAN: 6
HDLC SERPL-MCNC: 29 MG/DL (ref 30–70)
HGB BLD-MCNC: 11.8 G/DL (ref 11–16)
INR PPP: 1
LDLC SERPL-MCNC: 49 MG/DL (ref 0–129)
LYMPHOCYTES # BLD AUTO: 0.9 K/UL (ref 1–4.3)
LYMPHOCYTES NFR BLD AUTO: 15.1 % (ref 20–40)
MCH RBC QN AUTO: 31.1 PG (ref 27–31)
MCHC RBC AUTO-ENTMCNC: 32.5 G/DL (ref 33–37)
MCV RBC AUTO: 95.7 FL (ref 81–99)
MONOCYTES # BLD: 0.3 K/UL (ref 0–0.8)
MONOCYTES NFR BLD: 5.5 % (ref 0–10)
NEUTROPHILS # BLD: 4.4 K/UL (ref 1.8–7)
NEUTROPHILS NFR BLD AUTO: 77.8 % (ref 50–75)
NRBC BLD AUTO-RTO: 0 % (ref 0–2)
PLATELET # BLD: 228 K/UL (ref 130–400)
PMV BLD AUTO: 8.3 FL (ref 7.2–11.7)
PROTHROMBIN TIME: 11.3 SECONDS (ref 9.7–12.2)
RBC # BLD AUTO: 3.8 MIL/UL (ref 3.8–5.2)
WBC # BLD AUTO: 5.6 K/UL (ref 4.8–10.8)

## 2018-05-04 PROCEDURE — 047L34Z DILATION OF LEFT FEMORAL ARTERY WITH DRUG-ELUTING INTRALUMINAL DEVICE, PERCUTANEOUS APPROACH: ICD-10-PCS | Performed by: SURGERY

## 2018-05-04 PROCEDURE — 047Q3ZZ DILATION OF LEFT ANTERIOR TIBIAL ARTERY, PERCUTANEOUS APPROACH: ICD-10-PCS | Performed by: SURGERY

## 2018-05-04 PROCEDURE — 04CL3ZZ EXTIRPATION OF MATTER FROM LEFT FEMORAL ARTERY, PERCUTANEOUS APPROACH: ICD-10-PCS | Performed by: SURGERY

## 2018-05-04 PROCEDURE — 04CN3ZZ EXTIRPATION OF MATTER FROM LEFT POPLITEAL ARTERY, PERCUTANEOUS APPROACH: ICD-10-PCS | Performed by: SURGERY

## 2018-05-04 RX ADMIN — BRIMONIDINE TARTRATE SCH DROP: 2 SOLUTION/ DROPS OPHTHALMIC at 11:15

## 2018-05-04 RX ADMIN — HUMAN INSULIN SCH: 100 INJECTION, SOLUTION SUBCUTANEOUS at 08:16

## 2018-05-04 RX ADMIN — TAZOBACTAM SODIUM AND PIPERACILLIN SODIUM SCH MLS/HR: 250; 2 INJECTION, SOLUTION INTRAVENOUS at 01:00

## 2018-05-04 RX ADMIN — HUMAN INSULIN SCH: 100 INJECTION, SOLUTION SUBCUTANEOUS at 22:17

## 2018-05-04 RX ADMIN — BRIMONIDINE TARTRATE SCH DROP: 2 SOLUTION/ DROPS OPHTHALMIC at 21:57

## 2018-05-04 RX ADMIN — Medication SCH: at 08:16

## 2018-05-04 RX ADMIN — EMTRICITABINE AND TENOFOVIR DISOPROXIL FUMARATE SCH: 200; 300 TABLET, FILM COATED ORAL at 11:09

## 2018-05-04 RX ADMIN — HUMAN INSULIN SCH: 100 INJECTION, SOLUTION SUBCUTANEOUS at 16:55

## 2018-05-04 RX ADMIN — TAZOBACTAM SODIUM AND PIPERACILLIN SODIUM SCH MLS/HR: 250; 2 INJECTION, SOLUTION INTRAVENOUS at 21:57

## 2018-05-04 RX ADMIN — HUMAN INSULIN SCH: 100 INJECTION, SOLUTION SUBCUTANEOUS at 13:04

## 2018-05-04 RX ADMIN — TAZOBACTAM SODIUM AND PIPERACILLIN SODIUM SCH MLS/HR: 250; 2 INJECTION, SOLUTION INTRAVENOUS at 11:10

## 2018-05-05 VITALS — RESPIRATION RATE: 20 BRPM

## 2018-05-05 LAB
ALBUMIN SERPL-MCNC: 3.5 G/DL (ref 3.5–5)
ALBUMIN/GLOB SERPL: 0.7 {RATIO} (ref 1–2.1)
ALT SERPL-CCNC: 79 U/L (ref 9–52)
AST SERPL-CCNC: 114 U/L (ref 14–36)
BASOPHILS # BLD AUTO: 0 K/UL (ref 0–0.2)
BASOPHILS NFR BLD: 0.3 % (ref 0–2)
BUN SERPL-MCNC: 24 MG/DL (ref 7–17)
CALCIUM SERPL-MCNC: 8.7 MG/DL (ref 8.6–10.4)
EOSINOPHIL # BLD AUTO: 0.1 K/UL (ref 0–0.7)
EOSINOPHIL NFR BLD: 1.6 % (ref 0–4)
ERYTHROCYTE [DISTWIDTH] IN BLOOD BY AUTOMATED COUNT: 20.9 % (ref 11.5–14.5)
GFR NON-AFRICAN AMERICAN: 9
HGB BLD-MCNC: 11.8 G/DL (ref 11–16)
LYMPHOCYTES # BLD AUTO: 0.8 K/UL (ref 1–4.3)
LYMPHOCYTES NFR BLD AUTO: 15.9 % (ref 20–40)
MCH RBC QN AUTO: 31.6 PG (ref 27–31)
MCHC RBC AUTO-ENTMCNC: 33 G/DL (ref 33–37)
MCV RBC AUTO: 95.9 FL (ref 81–99)
MONOCYTES # BLD: 0.5 K/UL (ref 0–0.8)
MONOCYTES NFR BLD: 10 % (ref 0–10)
NEUTROPHILS # BLD: 3.6 K/UL (ref 1.8–7)
NEUTROPHILS NFR BLD AUTO: 72.2 % (ref 50–75)
NRBC BLD AUTO-RTO: 0.1 % (ref 0–2)
PLATELET # BLD: 197 K/UL (ref 130–400)
PMV BLD AUTO: 8 FL (ref 7.2–11.7)
RBC # BLD AUTO: 3.74 MIL/UL (ref 3.8–5.2)
WBC # BLD AUTO: 5 K/UL (ref 4.8–10.8)

## 2018-05-05 RX ADMIN — Medication SCH TAB: at 08:47

## 2018-05-05 RX ADMIN — BRIMONIDINE TARTRATE SCH DROP: 2 SOLUTION/ DROPS OPHTHALMIC at 17:23

## 2018-05-05 RX ADMIN — TAZOBACTAM SODIUM AND PIPERACILLIN SODIUM SCH MLS/HR: 250; 2 INJECTION, SOLUTION INTRAVENOUS at 17:24

## 2018-05-05 RX ADMIN — TAZOBACTAM SODIUM AND PIPERACILLIN SODIUM SCH MLS/HR: 250; 2 INJECTION, SOLUTION INTRAVENOUS at 09:21

## 2018-05-05 RX ADMIN — EMTRICITABINE AND TENOFOVIR DISOPROXIL FUMARATE SCH TAB: 200; 300 TABLET, FILM COATED ORAL at 09:22

## 2018-05-05 RX ADMIN — HUMAN INSULIN SCH: 100 INJECTION, SOLUTION SUBCUTANEOUS at 16:44

## 2018-05-05 RX ADMIN — HUMAN INSULIN SCH: 100 INJECTION, SOLUTION SUBCUTANEOUS at 07:56

## 2018-05-05 RX ADMIN — TAZOBACTAM SODIUM AND PIPERACILLIN SODIUM SCH MLS/HR: 250; 2 INJECTION, SOLUTION INTRAVENOUS at 01:56

## 2018-05-05 RX ADMIN — HUMAN INSULIN SCH: 100 INJECTION, SOLUTION SUBCUTANEOUS at 22:07

## 2018-05-05 RX ADMIN — BRIMONIDINE TARTRATE SCH DROP: 2 SOLUTION/ DROPS OPHTHALMIC at 09:23

## 2018-05-05 RX ADMIN — HUMAN INSULIN SCH: 100 INJECTION, SOLUTION SUBCUTANEOUS at 11:37

## 2018-05-06 VITALS
SYSTOLIC BLOOD PRESSURE: 97 MMHG | HEART RATE: 70 BPM | TEMPERATURE: 98.8 F | OXYGEN SATURATION: 96 % | DIASTOLIC BLOOD PRESSURE: 61 MMHG

## 2018-05-06 LAB
ALBUMIN SERPL-MCNC: 3.5 G/DL (ref 3.5–5)
ALBUMIN/GLOB SERPL: 0.8 {RATIO} (ref 1–2.1)
ALT SERPL-CCNC: 63 U/L (ref 9–52)
AST SERPL-CCNC: 81 U/L (ref 14–36)
BASOPHILS # BLD AUTO: 0 K/UL (ref 0–0.2)
BASOPHILS NFR BLD: 0.7 % (ref 0–2)
BUN SERPL-MCNC: 34 MG/DL (ref 7–17)
CALCIUM SERPL-MCNC: 8.5 MG/DL (ref 8.6–10.4)
EOSINOPHIL # BLD AUTO: 0.1 K/UL (ref 0–0.7)
EOSINOPHIL NFR BLD: 1.7 % (ref 0–4)
ERYTHROCYTE [DISTWIDTH] IN BLOOD BY AUTOMATED COUNT: 21 % (ref 11.5–14.5)
GFR NON-AFRICAN AMERICAN: 6
HGB BLD-MCNC: 10.9 G/DL (ref 11–16)
LYMPHOCYTES # BLD AUTO: 0.9 K/UL (ref 1–4.3)
LYMPHOCYTES NFR BLD AUTO: 16.5 % (ref 20–40)
MCH RBC QN AUTO: 31.7 PG (ref 27–31)
MCHC RBC AUTO-ENTMCNC: 33.4 G/DL (ref 33–37)
MCV RBC AUTO: 94.8 FL (ref 81–99)
MONOCYTES # BLD: 0.5 K/UL (ref 0–0.8)
MONOCYTES NFR BLD: 8.3 % (ref 0–10)
NEUTROPHILS # BLD: 4.2 K/UL (ref 1.8–7)
NEUTROPHILS NFR BLD AUTO: 72.8 % (ref 50–75)
NRBC BLD AUTO-RTO: 0.1 % (ref 0–2)
PLATELET # BLD: 214 K/UL (ref 130–400)
PMV BLD AUTO: 8.1 FL (ref 7.2–11.7)
RBC # BLD AUTO: 3.44 MIL/UL (ref 3.8–5.2)
WBC # BLD AUTO: 5.8 K/UL (ref 4.8–10.8)

## 2018-05-06 RX ADMIN — HUMAN INSULIN SCH: 100 INJECTION, SOLUTION SUBCUTANEOUS at 07:58

## 2018-05-06 RX ADMIN — TAZOBACTAM SODIUM AND PIPERACILLIN SODIUM SCH MLS/HR: 250; 2 INJECTION, SOLUTION INTRAVENOUS at 08:50

## 2018-05-06 RX ADMIN — TAZOBACTAM SODIUM AND PIPERACILLIN SODIUM SCH MLS/HR: 250; 2 INJECTION, SOLUTION INTRAVENOUS at 00:27

## 2018-05-06 RX ADMIN — EMTRICITABINE AND TENOFOVIR DISOPROXIL FUMARATE SCH TAB: 200; 300 TABLET, FILM COATED ORAL at 09:24

## 2018-05-06 RX ADMIN — BRIMONIDINE TARTRATE SCH DROP: 2 SOLUTION/ DROPS OPHTHALMIC at 09:24

## 2018-05-06 RX ADMIN — HUMAN INSULIN SCH: 100 INJECTION, SOLUTION SUBCUTANEOUS at 12:09

## 2018-05-06 RX ADMIN — Medication SCH TAB: at 08:50

## 2018-05-07 ENCOUNTER — HOSPITAL ENCOUNTER (EMERGENCY)
Dept: HOSPITAL 31 - C.ER | Age: 58
Discharge: HOME | End: 2018-05-07
Payer: MEDICARE

## 2018-05-07 VITALS
TEMPERATURE: 98.2 F | RESPIRATION RATE: 16 BRPM | OXYGEN SATURATION: 96 % | DIASTOLIC BLOOD PRESSURE: 42 MMHG | SYSTOLIC BLOOD PRESSURE: 93 MMHG | HEART RATE: 83 BPM

## 2018-05-07 VITALS — BODY MASS INDEX: 30.7 KG/M2

## 2018-05-07 DIAGNOSIS — G89.18: Primary | ICD-10-CM

## 2018-05-09 ENCOUNTER — HOSPITAL ENCOUNTER (EMERGENCY)
Dept: HOSPITAL 42 - ED | Age: 58
Discharge: ANOTHER HOSPITAL | End: 2018-05-09
Payer: MEDICARE

## 2018-05-09 VITALS
TEMPERATURE: 98.6 F | HEART RATE: 90 BPM | RESPIRATION RATE: 14 BRPM | SYSTOLIC BLOOD PRESSURE: 91 MMHG | DIASTOLIC BLOOD PRESSURE: 50 MMHG

## 2018-05-09 VITALS — BODY MASS INDEX: 29.9 KG/M2

## 2018-05-09 VITALS — OXYGEN SATURATION: 98 %

## 2018-05-09 DIAGNOSIS — L03.116: Primary | ICD-10-CM

## 2018-05-09 DIAGNOSIS — I96: ICD-10-CM

## 2018-05-09 DIAGNOSIS — E11.52: ICD-10-CM

## 2018-05-09 LAB
ALBUMIN SERPL-MCNC: 3.9 G/DL (ref 3–4.8)
ALBUMIN/GLOB SERPL: 0.8 {RATIO} (ref 1.1–1.8)
ALT SERPL-CCNC: 26 U/L (ref 7–56)
APTT BLD: 34.1 SECONDS (ref 25.1–36.5)
AST SERPL-CCNC: 52 U/L (ref 14–36)
BASOPHILS # BLD AUTO: 0.02 K/MM3 (ref 0–2)
BASOPHILS NFR BLD: 0.2 % (ref 0–3)
BUN SERPL-MCNC: 20 MG/DL (ref 7–21)
CALCIUM SERPL-MCNC: 8.9 MG/DL (ref 8.4–10.5)
EOSINOPHIL # BLD: 0 10*3/UL (ref 0–0.7)
EOSINOPHIL NFR BLD: 0.4 % (ref 1.5–5)
ERYTHROCYTE [DISTWIDTH] IN BLOOD BY AUTOMATED COUNT: 20.1 % (ref 11.5–14.5)
GFR NON-AFRICAN AMERICAN: 11
GRANULOCYTES # BLD: 7.44 10*3/UL (ref 1.4–6.5)
GRANULOCYTES NFR BLD: 82.9 % (ref 50–68)
HGB BLD-MCNC: 10.5 G/DL (ref 12–16)
INR PPP: 1.11 (ref 0.93–1.08)
LYMPHOCYTES # BLD: 0.8 10*3/UL (ref 1.2–3.4)
LYMPHOCYTES NFR BLD AUTO: 8.9 % (ref 22–35)
MCH RBC QN AUTO: 30.1 PG (ref 25–35)
MCHC RBC AUTO-ENTMCNC: 31.6 G/DL (ref 31–37)
MCV RBC AUTO: 95.1 FL (ref 80–105)
MONOCYTES # BLD AUTO: 0.7 10*3/UL (ref 0.1–0.6)
MONOCYTES NFR BLD: 7.6 % (ref 1–6)
PLATELET # BLD: 331 10^3/UL (ref 120–450)
PMV BLD AUTO: 9.5 FL (ref 7–11)
PROTHROMBIN TIME: 12.7 SECONDS (ref 9.4–12.5)
RBC # BLD AUTO: 3.49 10^6/UL (ref 3.5–6.1)
WBC # BLD AUTO: 9 10^3/UL (ref 4.5–11)

## 2018-05-09 PROCEDURE — 99282 EMERGENCY DEPT VISIT SF MDM: CPT

## 2018-05-09 PROCEDURE — 72HRC: CPT

## 2018-05-09 NOTE — ED PDOC
Arrival/HPI





- General


Chief Complaint: Abnormal Labs


Time Seen by Provider: 05/09/18 19:50


Historian: Patient, Family





- History of Present Illness


Narrative History of Present Illness (Text): 





05/09/18 21:49


57-year-old female with a history of PVD, DM status post recannulization of the 

left leg performed by Dr. Helton on 5/4/18 presents today sent in by dialysis 

center for bacteremia based off of blood cultures. Patient denies chest pain or 

shortness of breath denies abdominal pain. Patient is complaining of pain in 

the left leg. Patient denies fevers or chills at home. pt states she was told 

by her vascular surgeon that she might have to have the left leg amputated. 





Past Medical History





- Provider Review


Nursing Documentation Reviewed: Yes





- Travel History


Have you recently traveled outside [place] w/in the past 3 mons?: No





- Infectious Disease


Hx of Infectious Diseases: None





- Cardiac


Hx Congestive Heart Failure: Yes


Hx Hypertension: Yes


Hx Peripheral Vascular Disease: Yes





- Pulmonary


Hx Respiratory Disorders: No





- Neurological


Hx Dementia: Yes





- HEENT


Hx HEENT Disorder: Yes (wears glasses)


Hx Glaucoma: Yes





- Renal


Hx Renal Disorder: Yes


Type of Dialysis Access: R chest udall


Date of Last Dialysis Treatment: 05/09/18


Hx Kidney Stones: No





- Endocrine/Metabolic


Hx Diabetes Mellitus Type 2: Yes


Hx Hyperthyroidism: Yes


Hx Hypothyroidism: Yes





- Hematological/Oncological


Hx Blood Disorders: Yes





- Integumentary


Hx Dermatological Disorder: No





- Musculoskeletal/Rheumatological


Hx Musculoskeletal Disorders: Yes (Right BKA)


Hx Falls: Yes





- Gastrointestinal


Hx Gastrointestinal Disorders: No





- Genitourinary/Gynecological


Hx Genitourinary Disorders: Yes


Other/Comment: oliguria, on hemodialysis MWF





- Psychiatric


Hx Anxiety: Yes


Hx Substance Use: No





- Surgical History


Hx Amputation: Yes (Right BKA)


Other/Comment: vascular sx LLE





- Anesthesia


Hx Anesthesia: Yes


Hx Anesthesia Reactions: No


Hx Malignant Hyperthermia: No





- Suicidal Assessment


Feels Threatened In Home Enviroment: No





Family/Social History





- Physician Review


Nursing Documentation Reviewed: Yes


Family/Social History: Unknown Family HX


Smoking Status: Former Smoker


Hx Alcohol Use: No


Hx Substance Use: No





Allergies/Home Meds


Allergies/Adverse Reactions: 


Allergies





No Known Allergies Allergy (Verified 04/19/18 11:49)


 








Home Medications: 


 Home Meds











 Medication  Instructions  Recorded  Confirmed


 


Menthol/Zinc Oxide [Calmoseptine 1 applic TP Q8H 11/17/17 05/07/18





Ointment]   


 


Nateglinide [Starlix] 60 mg PO TID 05/03/18 05/07/18


 


Raltegravir Potassium [Isentress] 400 mg PO BID 05/03/18 05/07/18














Review of Systems





- Review of Systems


Constitutional: absent: Fatigue, Fevers


Respiratory: absent: SOB, Cough


Cardiovascular: absent: Chest Pain, Palpitations


Gastrointestinal: absent: Abdominal Pain, Nausea, Vomiting


Musculoskeletal: Arthralgias


Skin: Cellulitis





Physical Exam


Vital Signs Reviewed: Yes


Vital Signs











  Temp Pulse Resp BP Pulse Ox


 


 05/09/18 23:25  98.6 F  90  14  91/50 L  98


 


 05/09/18 21:36  98.5 F  92 H  20  90/42 L  98











Temperature: Afebrile


Blood Pressure: Hypotensive


Pulse: Regular


Respiratory Rate: Normal


Appearance: Positive for: Well-Appearing, Non-Toxic, Comfortable


Pain Distress: None


Mental Status: Positive for: Alert and Oriented X 3





- Systems Exam


Head: Present: Atraumatic


Respiratory/Chest: Present: Clear to Auscultation, Good Air Exchange.  No: 

Respiratory Distress, Accessory Muscle Use


Cardiovascular: Present: Regular Rate and Rhythm


Abdomen: No: Tenderness


Upper Extremity: Present: Normal ROM


Lower Extremity: Present: Tenderness, Swelling, Erythema, Other (no palpable 

pulses. Pulses present with doppler;   discoloration to all toes with blisters 

noted to great toe and 2nd toe, + erythema to dorsal foot and ankle with warmth 

to dorsal foot ankle and leg.).  No: CALF TENDERNESS, Normal ROM, 

Neurovascularly Intact, Capillary Refill < 2 s


Neurological: Present: GCS=15, Speech Normal


Skin: Present: Warm, Dry, Normal Color


Psychiatric: Present: Alert, Oriented x 3





Medical Decision Making


ED Course and Treatment: 





05/09/18 21:57


57yr old female with hx of PVD.  s/p dialysis with report of + blood cultures. 

pt in no distress. eating food in ER. 





pt with + pulses in left foot present with doppler but not palpable;  with 

discoloration to all toes, + erythema to dorsal foot and ankle with warmth to 

dorsal foot ankle and leg. 





pt seen at [facility] by surgical resident dr. ugalde.   case discussed with dr. helton. will transfer to [facility] for further evaluation. 





pt was given Ceftazidime and vancomycin while in dialysis prior to arrival. 





xray; no fracture. no sq air. 








pt seen and evaluated by dr. eddy.  EJ placed by dr. eddy   








case discussed with dr. stevens (hospitalist at [facility]) accepts 

admission with surgical consult dr. helton





case discussed with dr. Yang ([facility]) accepts transfer. 








case was discussed with surgical resident dr. mackenzie at [facility] aware of 

consult. 








impression; cellulitis, vascular insuffiency, gangrene


transfer to [facility]. 








- Lab Interpretations


Lab Results: 








 05/09/18 22:22 





 05/09/18 22:22 





 Lab Results





05/09/18 22:22: Blood Type B POSITIVE, Antibody Screen Negative, BBK History 

Checked Patient has bt


05/09/18 22:22: WBC 9.0  D, RBC 3.49 L, Hgb 10.5 L D, Hct 33.2 L, MCV 95.1, MCH 

30.1, MCHC 31.6, RDW 20.1 H, Plt Count 331, MPV 9.5, Gran % 82.9 H, Lymph % (

Auto) 8.9 L, Mono % (Auto) 7.6 H, Eos % (Auto) 0.4 L, Baso % (Auto) 0.2, Gran # 

7.44 H, Lymph # (Auto) 0.8 L, Mono # (Auto) 0.7 H, Eos # (Auto) 0.0, Baso # (

Auto) 0.02


05/09/18 22:22: Sodium 141, Potassium 3.9, Chloride 98, Carbon Dioxide 30, 

Anion Gap 18, BUN 20, Creatinine 4.1 H, Est GFR ( Amer) 14, Est GFR (Non-

Af Amer) 11, Random Glucose 138 H, Calcium 8.9, Total Bilirubin 0.6, AST 52 H D

, ALT 26, Alkaline Phosphatase 236 H, Total Protein 8.5 H, Albumin 3.9, 

Globulin 4.6, Albumin/Globulin Ratio 0.8 L


05/09/18 22:22: PT 12.7 H, INR 1.11 H, APTT 34.1











- RAD Interpretation


Radiology Orders: 








05/09/18 20:39


FOOT LEFT 3 VIEWS ROUTINE [RAD] Stat 














- Medication Orders


Current Medication Orders: 











Discontinued Medications





Sodium Chloride (Sodium Chloride 0.9%)  250 mls @ 999 mls/hr IV .Q16M STA


   Stop: 05/09/18 22:17


   Last Admin: 05/09/18 22:07  Dose: 999 mls/hr





eMAR Start Stop


 Document     05/09/18 22:07  HI  (Rec: 05/09/18 22:07  HI  [id number])


     Intravenous Solution


      Start Date                                 05/09/18


      Start Time                                 22:07














Disposition/Present on Arrival





- Present on Arrival


Any Indicators Present on Arrival: Yes


History of DVT/PE: No


History of Uncontrolled Diabetes: Yes


Urinary Catheter: No


History of Decub. Ulcer: No


History Surgical Site Infection Following: None





- Disposition


Have Diagnosis and Disposition been Completed?: Yes


Diagnosis: 


 Cellulitis, Vascular insufficiency of extremity, Gangrene





Disposition: Transfer [facility]


Disposition Time: 21:52


Patient Plan: Transfer To ([facility]; accepting physician dr. stevens, dr helton, dr. yang)


Patient Problems: 


 Current Active Problems











Problem Status Onset


 


Cellulitis Acute  


 


Vascular insufficiency of extremity Acute  


 


Gangrene Acute  











Condition: FAIR


Discharge Instructions (ExitCare):  Cellulitis (ED)


Forms:  [facility] (English)

## 2018-05-10 NOTE — RAD
PROCEDURE:  Left Foot Radiographs.



HISTORY:

foot infection/gangrene  



COMPARISON:

None.



FINDINGS:



BONES:

No displaced fracture or destructive bony lesion is appreciated.  

However, there is marked osteopenia suggesting osteoporosis 

potentially ended advanced state. Soft tissue loss of the posterior 

heel suggests an ulcer but there is no periosteal reaction or 

cortical erosion related to the posterior calcaneus nevertheless. 

Instead, there is a moderate plantar calcaneal spur and ossification 

of the insertion of the Achilles tendon. No definitive pattern to 

suggest osteomyelitis throughout the exam. 



JOINTS:

Hyperextension deformities of the metatarsal phalangeal joints are 

identified with hyper flexion deformities throughout the 

interphalangeal joints diffusely. The ankle appears in chronic 

plantar flexion as well. 



SOFT TISSUES:

Vascular calcifications are scattered throughout the dorsal and 

plantar foot soft tissues as well as anterior and posterior ankle. 

Soft tissue deformity seen the posterior distal leg/ upper ankle soft 

tissues. 



OTHER FINDINGS:

None.



IMPRESSION:

No definitive pattern to suggest osteomyelitis although ulcer is 

suggested at the posterior heel soft tissues. Clinical concern 

remains, then follow-up MRI should be considered.



Extensive osteopenia suggests osteoporosis.



No displaced fracture or dislocation identified.

## 2018-06-04 ENCOUNTER — HOSPITAL ENCOUNTER (INPATIENT)
Dept: HOSPITAL 31 - C.ER | Age: 58
LOS: 7 days | Discharge: HOME | DRG: 239 | End: 2018-06-11
Attending: HOSPITALIST | Admitting: HOSPITALIST
Payer: MEDICARE

## 2018-06-04 VITALS — BODY MASS INDEX: 29.9 KG/M2

## 2018-06-04 DIAGNOSIS — F41.9: ICD-10-CM

## 2018-06-04 DIAGNOSIS — I13.2: ICD-10-CM

## 2018-06-04 DIAGNOSIS — E11.52: Primary | ICD-10-CM

## 2018-06-04 DIAGNOSIS — D63.1: ICD-10-CM

## 2018-06-04 DIAGNOSIS — E03.9: ICD-10-CM

## 2018-06-04 DIAGNOSIS — Z87.891: ICD-10-CM

## 2018-06-04 DIAGNOSIS — Z89.511: ICD-10-CM

## 2018-06-04 DIAGNOSIS — F03.90: ICD-10-CM

## 2018-06-04 DIAGNOSIS — L97.529: ICD-10-CM

## 2018-06-04 DIAGNOSIS — K59.00: ICD-10-CM

## 2018-06-04 DIAGNOSIS — J44.9: ICD-10-CM

## 2018-06-04 DIAGNOSIS — I96: ICD-10-CM

## 2018-06-04 DIAGNOSIS — E11.621: ICD-10-CM

## 2018-06-04 DIAGNOSIS — F32.9: ICD-10-CM

## 2018-06-04 DIAGNOSIS — I50.22: ICD-10-CM

## 2018-06-04 DIAGNOSIS — E11.65: ICD-10-CM

## 2018-06-04 DIAGNOSIS — E11.22: ICD-10-CM

## 2018-06-04 DIAGNOSIS — Z99.2: ICD-10-CM

## 2018-06-04 DIAGNOSIS — D69.59: ICD-10-CM

## 2018-06-04 DIAGNOSIS — E87.6: ICD-10-CM

## 2018-06-04 DIAGNOSIS — Z79.4: ICD-10-CM

## 2018-06-04 DIAGNOSIS — E78.5: ICD-10-CM

## 2018-06-04 DIAGNOSIS — Z86.711: ICD-10-CM

## 2018-06-04 DIAGNOSIS — N18.6: ICD-10-CM

## 2018-06-04 DIAGNOSIS — I95.3: ICD-10-CM

## 2018-06-04 DIAGNOSIS — Z21: ICD-10-CM

## 2018-06-04 DIAGNOSIS — F60.9: ICD-10-CM

## 2018-06-04 LAB
ALBUMIN SERPL-MCNC: 3.9 G/DL (ref 3.5–5)
ALBUMIN/GLOB SERPL: 0.8 {RATIO} (ref 1–2.1)
ALT SERPL-CCNC: 14 U/L (ref 9–52)
APTT BLD: 23 SECONDS (ref 21–34)
AST SERPL-CCNC: 19 U/L (ref 14–36)
BASOPHILS # BLD AUTO: 0.1 K/UL (ref 0–0.2)
BASOPHILS NFR BLD: 1 % (ref 0–2)
BUN SERPL-MCNC: 43 MG/DL (ref 7–17)
CALCIUM SERPL-MCNC: 8.9 MG/DL (ref 8.6–10.4)
EOSINOPHIL # BLD AUTO: 0.2 K/UL (ref 0–0.7)
EOSINOPHIL NFR BLD: 2.9 % (ref 0–4)
ERYTHROCYTE [DISTWIDTH] IN BLOOD BY AUTOMATED COUNT: 21.9 % (ref 11.5–14.5)
GFR NON-AFRICAN AMERICAN: 5
HGB BLD-MCNC: 13.4 G/DL (ref 11–16)
INR PPP: 0.9
LYMPHOCYTES # BLD AUTO: 1.5 K/UL (ref 1–4.3)
LYMPHOCYTES NFR BLD AUTO: 19.8 % (ref 20–40)
MCH RBC QN AUTO: 34.5 PG (ref 27–31)
MCHC RBC AUTO-ENTMCNC: 33.5 G/DL (ref 33–37)
MCV RBC AUTO: 103.1 FL (ref 81–99)
MONOCYTES # BLD: 0.5 K/UL (ref 0–0.8)
MONOCYTES NFR BLD: 5.9 % (ref 0–10)
NEUTROPHILS # BLD: 5.4 K/UL (ref 1.8–7)
NEUTROPHILS NFR BLD AUTO: 70.4 % (ref 50–75)
NRBC BLD AUTO-RTO: 0.5 % (ref 0–2)
PLATELET # BLD: 203 K/UL (ref 130–400)
PMV BLD AUTO: 8.4 FL (ref 7.2–11.7)
PROTHROMBIN TIME: 10.2 SECONDS (ref 9.7–12.2)
RBC # BLD AUTO: 3.88 MIL/UL (ref 3.8–5.2)
WBC # BLD AUTO: 7.7 K/UL (ref 4.8–10.8)

## 2018-06-04 PROCEDURE — 5A1D70Z PERFORMANCE OF URINARY FILTRATION, INTERMITTENT, LESS THAN 6 HOURS PER DAY: ICD-10-PCS

## 2018-06-04 RX ADMIN — INSULIN ASPART SCH: 100 INJECTION, SOLUTION INTRAVENOUS; SUBCUTANEOUS at 22:22

## 2018-06-04 RX ADMIN — SODIUM CHLORIDE SCH: 9 INJECTION, SOLUTION INTRAVENOUS at 22:20

## 2018-06-04 RX ADMIN — LOPINAVIR AND RITONAVIR SCH CAP: 200; 50 TABLET, FILM COATED ORAL at 17:46

## 2018-06-05 RX ADMIN — ZINC OXIDE SCH: 200 OINTMENT TOPICAL at 17:39

## 2018-06-05 RX ADMIN — SODIUM CHLORIDE SCH MLS/HR: 9 INJECTION, SOLUTION INTRAVENOUS at 13:43

## 2018-06-05 RX ADMIN — SODIUM CHLORIDE SCH MLS/HR: 9 INJECTION, SOLUTION INTRAVENOUS at 05:51

## 2018-06-05 RX ADMIN — INSULIN ASPART SCH: 100 INJECTION, SOLUTION INTRAVENOUS; SUBCUTANEOUS at 22:32

## 2018-06-05 RX ADMIN — EMTRICITABINE AND TENOFOVIR DISOPROXIL FUMARATE SCH TAB: 200; 300 TABLET, FILM COATED ORAL at 17:38

## 2018-06-05 RX ADMIN — INSULIN ASPART SCH: 100 INJECTION, SOLUTION INTRAVENOUS; SUBCUTANEOUS at 13:17

## 2018-06-05 RX ADMIN — LOPINAVIR AND RITONAVIR SCH CAP: 200; 50 TABLET, FILM COATED ORAL at 17:38

## 2018-06-05 RX ADMIN — INSULIN ASPART SCH: 100 INJECTION, SOLUTION INTRAVENOUS; SUBCUTANEOUS at 07:15

## 2018-06-05 RX ADMIN — SODIUM CHLORIDE SCH MLS/HR: 9 INJECTION, SOLUTION INTRAVENOUS at 21:53

## 2018-06-05 RX ADMIN — ZINC OXIDE SCH: 200 OINTMENT TOPICAL at 00:07

## 2018-06-05 RX ADMIN — LOPINAVIR AND RITONAVIR SCH: 200; 50 TABLET, FILM COATED ORAL at 10:17

## 2018-06-05 RX ADMIN — SODIUM CHLORIDE SCH MLS/HR: 9 INJECTION, SOLUTION INTRAVENOUS at 05:33

## 2018-06-05 RX ADMIN — ZINC OXIDE SCH: 200 OINTMENT TOPICAL at 07:52

## 2018-06-05 RX ADMIN — Medication SCH: at 10:17

## 2018-06-05 RX ADMIN — INSULIN ASPART SCH UNIT: 100 INJECTION, SOLUTION INTRAVENOUS; SUBCUTANEOUS at 17:39

## 2018-06-06 LAB
ALBUMIN SERPL-MCNC: 3.1 G/DL (ref 3.5–5)
ALBUMIN SERPL-MCNC: 3.7 G/DL (ref 3.5–5)
ALBUMIN/GLOB SERPL: 0.8 {RATIO} (ref 1–2.1)
ALBUMIN/GLOB SERPL: 0.8 {RATIO} (ref 1–2.1)
ALT SERPL-CCNC: 22 U/L (ref 9–52)
ALT SERPL-CCNC: < 6 U/L (ref 9–52)
AST SERPL-CCNC: 28 U/L (ref 14–36)
AST SERPL-CCNC: 28 U/L (ref 14–36)
BASOPHILS # BLD AUTO: 0 K/UL (ref 0–0.2)
BASOPHILS NFR BLD: 0.6 % (ref 0–2)
BUN SERPL-MCNC: 14 MG/DL (ref 7–17)
BUN SERPL-MCNC: 35 MG/DL (ref 7–17)
CALCIUM SERPL-MCNC: 7.7 MG/DL (ref 8.6–10.4)
CALCIUM SERPL-MCNC: 8.7 MG/DL (ref 8.6–10.4)
EOSINOPHIL # BLD AUTO: 0.1 K/UL (ref 0–0.7)
EOSINOPHIL NFR BLD: 1.8 % (ref 0–4)
ERYTHROCYTE [DISTWIDTH] IN BLOOD BY AUTOMATED COUNT: 21.6 % (ref 11.5–14.5)
GFR NON-AFRICAN AMERICAN: 15
GFR NON-AFRICAN AMERICAN: 7
HGB BLD-MCNC: 9.2 G/DL (ref 11–16)
LYMPHOCYTES # BLD AUTO: 0.7 K/UL (ref 1–4.3)
LYMPHOCYTES NFR BLD AUTO: 14.8 % (ref 20–40)
MCH RBC QN AUTO: 32.5 PG (ref 27–31)
MCHC RBC AUTO-ENTMCNC: 31.9 G/DL (ref 33–37)
MCV RBC AUTO: 102.1 FL (ref 81–99)
MONOCYTES # BLD: 0.3 K/UL (ref 0–0.8)
MONOCYTES NFR BLD: 6.3 % (ref 0–10)
NEUTROPHILS # BLD: 3.6 K/UL (ref 1.8–7)
NEUTROPHILS NFR BLD AUTO: 76.5 % (ref 50–75)
NRBC BLD AUTO-RTO: 0.1 % (ref 0–2)
PLATELET # BLD: 94 K/UL (ref 130–400)
PMV BLD AUTO: 8.7 FL (ref 7.2–11.7)
RBC # BLD AUTO: 2.84 MIL/UL (ref 3.8–5.2)
WBC # BLD AUTO: 4.7 K/UL (ref 4.8–10.8)

## 2018-06-06 PROCEDURE — 0Y6D0Z1 DETACHMENT AT LEFT UPPER LEG, HIGH, OPEN APPROACH: ICD-10-PCS | Performed by: SURGERY

## 2018-06-06 RX ADMIN — LOPINAVIR AND RITONAVIR SCH: 200; 50 TABLET, FILM COATED ORAL at 22:00

## 2018-06-06 RX ADMIN — SODIUM CHLORIDE SCH MLS/HR: 9 INJECTION, SOLUTION INTRAVENOUS at 05:58

## 2018-06-06 RX ADMIN — INSULIN ASPART SCH: 100 INJECTION, SOLUTION INTRAVENOUS; SUBCUTANEOUS at 22:38

## 2018-06-06 RX ADMIN — ZINC OXIDE SCH: 200 OINTMENT TOPICAL at 09:08

## 2018-06-06 RX ADMIN — ZINC OXIDE SCH: 200 OINTMENT TOPICAL at 17:46

## 2018-06-06 RX ADMIN — SODIUM CHLORIDE SCH MLS/HR: 9 INJECTION, SOLUTION INTRAVENOUS at 21:58

## 2018-06-06 RX ADMIN — VANCOMYCIN HYDROCHLORIDE SCH MLS/HR: 1 INJECTION, POWDER, LYOPHILIZED, FOR SOLUTION INTRAVENOUS at 09:09

## 2018-06-06 RX ADMIN — HYDROMORPHONE HYDROCHLORIDE PRN MG: 1 INJECTION, SOLUTION INTRAMUSCULAR; INTRAVENOUS; SUBCUTANEOUS at 12:06

## 2018-06-06 RX ADMIN — INSULIN ASPART SCH: 100 INJECTION, SOLUTION INTRAVENOUS; SUBCUTANEOUS at 12:59

## 2018-06-06 RX ADMIN — LOPINAVIR AND RITONAVIR SCH: 200; 50 TABLET, FILM COATED ORAL at 12:59

## 2018-06-06 RX ADMIN — INSULIN ASPART SCH: 100 INJECTION, SOLUTION INTRAVENOUS; SUBCUTANEOUS at 07:48

## 2018-06-06 RX ADMIN — INSULIN ASPART SCH: 100 INJECTION, SOLUTION INTRAVENOUS; SUBCUTANEOUS at 17:47

## 2018-06-06 RX ADMIN — Medication SCH: at 09:23

## 2018-06-06 RX ADMIN — ZINC OXIDE SCH: 200 OINTMENT TOPICAL at 01:29

## 2018-06-06 RX ADMIN — HYDROMORPHONE HYDROCHLORIDE PRN MG: 1 INJECTION, SOLUTION INTRAMUSCULAR; INTRAVENOUS; SUBCUTANEOUS at 22:25

## 2018-06-06 RX ADMIN — HYDROMORPHONE HYDROCHLORIDE PRN MG: 1 INJECTION, SOLUTION INTRAMUSCULAR; INTRAVENOUS; SUBCUTANEOUS at 12:26

## 2018-06-07 LAB
ALBUMIN SERPL-MCNC: 4 G/DL (ref 3.5–5)
ALBUMIN/GLOB SERPL: 0.8 {RATIO} (ref 1–2.1)
ALT SERPL-CCNC: < 6 U/L (ref 9–52)
ANISOCYTOSIS BLD QL SMEAR: (no result)
AST SERPL-CCNC: 34 U/L (ref 14–36)
BASOPHILS # BLD AUTO: 0 K/UL (ref 0–0.2)
BASOPHILS NFR BLD: 0.8 % (ref 0–2)
BUN SERPL-MCNC: 19 MG/DL (ref 7–17)
CALCIUM SERPL-MCNC: 8.5 MG/DL (ref 8.6–10.4)
EOSINOPHIL # BLD AUTO: 0 K/UL (ref 0–0.7)
EOSINOPHIL NFR BLD: 0.4 % (ref 0–4)
ERYTHROCYTE [DISTWIDTH] IN BLOOD BY AUTOMATED COUNT: 21.2 % (ref 11.5–14.5)
GFR NON-AFRICAN AMERICAN: 11
HGB BLD-MCNC: 10.7 G/DL (ref 11–16)
LYMPHOCYTE: 9 % (ref 20–40)
LYMPHOCYTES # BLD AUTO: 0.5 K/UL (ref 1–4.3)
LYMPHOCYTES NFR BLD AUTO: 9.1 % (ref 20–40)
MACROCYTES BLD QL SMEAR: (no result)
MCH RBC QN AUTO: 33.4 PG (ref 27–31)
MCHC RBC AUTO-ENTMCNC: 31.7 G/DL (ref 33–37)
MCV RBC AUTO: 105.2 FL (ref 81–99)
MONOCYTE: 7 % (ref 0–10)
MONOCYTES # BLD: 0.3 K/UL (ref 0–0.8)
MONOCYTES NFR BLD: 5.4 % (ref 0–10)
NEUTROPHILS # BLD: 5 K/UL (ref 1.8–7)
NEUTROPHILS NFR BLD AUTO: 84 % (ref 50–75)
NEUTROPHILS NFR BLD AUTO: 84.3 % (ref 50–75)
NRBC BLD AUTO-RTO: 0.1 % (ref 0–2)
OVALOCYTES BLD QL SMEAR: SLIGHT
PLATELET # BLD EST: (no result) 10*3/UL
PLATELET # BLD: 75 K/UL (ref 130–400)
PMV BLD AUTO: 9.6 FL (ref 7.2–11.7)
RBC # BLD AUTO: 3.19 MIL/UL (ref 3.8–5.2)
TOTAL CELLS COUNTED BLD: 100
WBC # BLD AUTO: 5.9 K/UL (ref 4.8–10.8)

## 2018-06-07 RX ADMIN — SODIUM CHLORIDE SCH MLS/HR: 9 INJECTION, SOLUTION INTRAVENOUS at 05:38

## 2018-06-07 RX ADMIN — ZINC OXIDE SCH APPLIC: 200 OINTMENT TOPICAL at 17:43

## 2018-06-07 RX ADMIN — ZINC OXIDE SCH APPLIC: 200 OINTMENT TOPICAL at 09:35

## 2018-06-07 RX ADMIN — INSULIN ASPART SCH: 100 INJECTION, SOLUTION INTRAVENOUS; SUBCUTANEOUS at 22:56

## 2018-06-07 RX ADMIN — OXYCODONE HYDROCHLORIDE AND ACETAMINOPHEN PRN TAB: 5; 325 TABLET ORAL at 14:07

## 2018-06-07 RX ADMIN — EMTRICITABINE AND TENOFOVIR DISOPROXIL FUMARATE SCH TAB: 200; 300 TABLET, FILM COATED ORAL at 17:46

## 2018-06-07 RX ADMIN — OXYCODONE HYDROCHLORIDE AND ACETAMINOPHEN PRN TAB: 5; 325 TABLET ORAL at 09:32

## 2018-06-07 RX ADMIN — LOPINAVIR AND RITONAVIR SCH CAP: 200; 50 TABLET, FILM COATED ORAL at 17:46

## 2018-06-07 RX ADMIN — HYDROMORPHONE HYDROCHLORIDE PRN MG: 1 INJECTION, SOLUTION INTRAMUSCULAR; INTRAVENOUS; SUBCUTANEOUS at 02:28

## 2018-06-07 RX ADMIN — Medication SCH TAB: at 09:35

## 2018-06-07 RX ADMIN — SODIUM CHLORIDE SCH MLS/HR: 9 INJECTION, SOLUTION INTRAVENOUS at 13:06

## 2018-06-07 RX ADMIN — INSULIN ASPART SCH UNIT: 100 INJECTION, SOLUTION INTRAVENOUS; SUBCUTANEOUS at 08:15

## 2018-06-07 RX ADMIN — HYDROMORPHONE HYDROCHLORIDE PRN MG: 1 INJECTION, SOLUTION INTRAMUSCULAR; INTRAVENOUS; SUBCUTANEOUS at 11:02

## 2018-06-07 RX ADMIN — INSULIN ASPART SCH UNIT: 100 INJECTION, SOLUTION INTRAVENOUS; SUBCUTANEOUS at 12:30

## 2018-06-07 RX ADMIN — INSULIN ASPART SCH UNIT: 100 INJECTION, SOLUTION INTRAVENOUS; SUBCUTANEOUS at 17:43

## 2018-06-07 RX ADMIN — ZINC OXIDE SCH APPLIC: 200 OINTMENT TOPICAL at 18:02

## 2018-06-07 RX ADMIN — ZINC OXIDE SCH APPLIC: 200 OINTMENT TOPICAL at 00:50

## 2018-06-07 RX ADMIN — LOPINAVIR AND RITONAVIR SCH CAP: 200; 50 TABLET, FILM COATED ORAL at 09:36

## 2018-06-07 RX ADMIN — HYDROMORPHONE HYDROCHLORIDE PRN MG: 1 INJECTION, SOLUTION INTRAMUSCULAR; INTRAVENOUS; SUBCUTANEOUS at 17:44

## 2018-06-07 RX ADMIN — SODIUM CHLORIDE SCH MLS/HR: 9 INJECTION, SOLUTION INTRAVENOUS at 21:46

## 2018-06-08 LAB
ALBUMIN SERPL-MCNC: 3.5 G/DL (ref 3.5–5)
ALBUMIN/GLOB SERPL: 0.9 {RATIO} (ref 1–2.1)
ALT SERPL-CCNC: 8 U/L (ref 9–52)
ANISOCYTOSIS BLD QL SMEAR: (no result)
AST SERPL-CCNC: 38 U/L (ref 14–36)
BASOPHILS # BLD AUTO: 0 K/UL (ref 0–0.2)
BASOPHILS NFR BLD: 0.5 % (ref 0–2)
BASOPHILS NFR BLD: 1 % (ref 0–2)
BUN SERPL-MCNC: 27 MG/DL (ref 7–17)
CALCIUM SERPL-MCNC: 7.9 MG/DL (ref 8.6–10.4)
EOSINOPHIL # BLD AUTO: 0.1 K/UL (ref 0–0.7)
EOSINOPHIL NFR BLD: 1 % (ref 0–4)
EOSINOPHIL NFR BLD: 1 % (ref 0–4)
ERYTHROCYTE [DISTWIDTH] IN BLOOD BY AUTOMATED COUNT: 20.5 % (ref 11.5–14.5)
GFR NON-AFRICAN AMERICAN: 8
HGB BLD-MCNC: 7.9 G/DL (ref 11–16)
LYMPHOCYTE: 8 % (ref 20–40)
LYMPHOCYTES # BLD AUTO: 0.5 K/UL (ref 1–4.3)
LYMPHOCYTES NFR BLD AUTO: 7.8 % (ref 20–40)
MACROCYTES BLD QL SMEAR: SLIGHT
MCH RBC QN AUTO: 33.7 PG (ref 27–31)
MCHC RBC AUTO-ENTMCNC: 33.1 G/DL (ref 33–37)
MCV RBC AUTO: 101.8 FL (ref 81–99)
MONOCYTE: 3 % (ref 0–10)
MONOCYTES # BLD: 0.3 K/UL (ref 0–0.8)
MONOCYTES NFR BLD: 4.6 % (ref 0–10)
NEUTROPHILS # BLD: 5.4 K/UL (ref 1.8–7)
NEUTROPHILS NFR BLD AUTO: 86.1 % (ref 50–75)
NEUTROPHILS NFR BLD AUTO: 87 % (ref 50–75)
NRBC BLD AUTO-RTO: 0 % (ref 0–2)
OVALOCYTES BLD QL SMEAR: SLIGHT
PLATELET # BLD EST: (no result) 10*3/UL
PLATELET # BLD: 117 K/UL (ref 130–400)
PMV BLD AUTO: 9 FL (ref 7.2–11.7)
RBC # BLD AUTO: 2.34 MIL/UL (ref 3.8–5.2)
TOTAL CELLS COUNTED BLD: 100
WBC # BLD AUTO: 6.2 K/UL (ref 4.8–10.8)

## 2018-06-08 RX ADMIN — HYDROMORPHONE HYDROCHLORIDE PRN MG: 1 INJECTION, SOLUTION INTRAMUSCULAR; INTRAVENOUS; SUBCUTANEOUS at 00:28

## 2018-06-08 RX ADMIN — SODIUM CHLORIDE SCH MLS/HR: 9 INJECTION, SOLUTION INTRAVENOUS at 22:23

## 2018-06-08 RX ADMIN — HYDROMORPHONE HYDROCHLORIDE PRN MG: 1 INJECTION, SOLUTION INTRAMUSCULAR; INTRAVENOUS; SUBCUTANEOUS at 22:20

## 2018-06-08 RX ADMIN — Medication SCH TAB: at 14:19

## 2018-06-08 RX ADMIN — HYDROMORPHONE HYDROCHLORIDE PRN MG: 1 INJECTION, SOLUTION INTRAMUSCULAR; INTRAVENOUS; SUBCUTANEOUS at 11:11

## 2018-06-08 RX ADMIN — ZINC OXIDE SCH APPLIC: 200 OINTMENT TOPICAL at 08:46

## 2018-06-08 RX ADMIN — HYDROMORPHONE HYDROCHLORIDE PRN MG: 1 INJECTION, SOLUTION INTRAMUSCULAR; INTRAVENOUS; SUBCUTANEOUS at 16:31

## 2018-06-08 RX ADMIN — INSULIN ASPART SCH: 100 INJECTION, SOLUTION INTRAVENOUS; SUBCUTANEOUS at 21:32

## 2018-06-08 RX ADMIN — HYDROMORPHONE HYDROCHLORIDE PRN MG: 1 INJECTION, SOLUTION INTRAMUSCULAR; INTRAVENOUS; SUBCUTANEOUS at 05:58

## 2018-06-08 RX ADMIN — SODIUM CHLORIDE SCH MLS/HR: 9 INJECTION, SOLUTION INTRAVENOUS at 05:46

## 2018-06-08 RX ADMIN — INSULIN ASPART SCH: 100 INJECTION, SOLUTION INTRAVENOUS; SUBCUTANEOUS at 17:43

## 2018-06-08 RX ADMIN — ZINC OXIDE SCH APPLIC: 200 OINTMENT TOPICAL at 00:30

## 2018-06-08 RX ADMIN — SODIUM CHLORIDE SCH MLS/HR: 9 INJECTION, SOLUTION INTRAVENOUS at 14:18

## 2018-06-08 RX ADMIN — INSULIN ASPART SCH: 100 INJECTION, SOLUTION INTRAVENOUS; SUBCUTANEOUS at 12:30

## 2018-06-08 RX ADMIN — ZINC OXIDE SCH APPLIC: 200 OINTMENT TOPICAL at 17:16

## 2018-06-08 RX ADMIN — INSULIN ASPART SCH UNIT: 100 INJECTION, SOLUTION INTRAVENOUS; SUBCUTANEOUS at 08:30

## 2018-06-08 RX ADMIN — LOPINAVIR AND RITONAVIR SCH CAP: 200; 50 TABLET, FILM COATED ORAL at 14:19

## 2018-06-08 RX ADMIN — LOPINAVIR AND RITONAVIR SCH CAP: 200; 50 TABLET, FILM COATED ORAL at 17:15

## 2018-06-08 RX ADMIN — VANCOMYCIN HYDROCHLORIDE SCH MLS/HR: 1 INJECTION, POWDER, LYOPHILIZED, FOR SOLUTION INTRAVENOUS at 15:18

## 2018-06-09 LAB
ANISOCYTOSIS BLD QL SMEAR: (no result)
BASOPHILS # BLD AUTO: 0 K/UL (ref 0–0.2)
BASOPHILS NFR BLD: 0.5 % (ref 0–2)
BURR CELLS BLD QL SMEAR: SLIGHT
EOSINOPHIL # BLD AUTO: 0 K/UL (ref 0–0.7)
EOSINOPHIL NFR BLD: 0.5 % (ref 0–4)
EOSINOPHIL NFR BLD: 1 % (ref 0–4)
ERYTHROCYTE [DISTWIDTH] IN BLOOD BY AUTOMATED COUNT: 24.1 % (ref 11.5–14.5)
HGB BLD-MCNC: 9.4 G/DL (ref 11–16)
HYPOCHROMIC: SLIGHT
LYMPHOCYTE: 7 % (ref 20–40)
LYMPHOCYTES # BLD AUTO: 0.6 K/UL (ref 1–4.3)
LYMPHOCYTES NFR BLD AUTO: 7.4 % (ref 20–40)
MACROCYTES BLD QL SMEAR: SLIGHT
MCH RBC QN AUTO: 31.8 PG (ref 27–31)
MCHC RBC AUTO-ENTMCNC: 33.1 G/DL (ref 33–37)
MCV RBC AUTO: 96 FL (ref 81–99)
MICROCYTES BLD QL SMEAR: SLIGHT
MONOCYTE: 12 % (ref 0–10)
MONOCYTES # BLD: 0.6 K/UL (ref 0–0.8)
MONOCYTES NFR BLD: 7.9 % (ref 0–10)
NEUTROPHILS # BLD: 6.8 K/UL (ref 1.8–7)
NEUTROPHILS NFR BLD AUTO: 80 % (ref 50–75)
NEUTROPHILS NFR BLD AUTO: 83.7 % (ref 50–75)
NRBC BLD AUTO-RTO: 0 % (ref 0–2)
OVALOCYTES BLD QL SMEAR: SLIGHT
PLATELET # BLD EST: NORMAL 10*3/UL
PLATELET # BLD: 136 K/UL (ref 130–400)
PMV BLD AUTO: 8.5 FL (ref 7.2–11.7)
POIKILOCYTOSIS BLD QL SMEAR: SLIGHT
RBC # BLD AUTO: 2.94 MIL/UL (ref 3.8–5.2)
TEARDROP CELLS: SLIGHT
TOTAL CELLS COUNTED BLD: 100
WBC # BLD AUTO: 8.1 K/UL (ref 4.8–10.8)

## 2018-06-09 RX ADMIN — INSULIN ASPART SCH: 100 INJECTION, SOLUTION INTRAVENOUS; SUBCUTANEOUS at 21:16

## 2018-06-09 RX ADMIN — INSULIN ASPART SCH UNIT: 100 INJECTION, SOLUTION INTRAVENOUS; SUBCUTANEOUS at 12:48

## 2018-06-09 RX ADMIN — SODIUM CHLORIDE SCH MLS/HR: 9 INJECTION, SOLUTION INTRAVENOUS at 14:27

## 2018-06-09 RX ADMIN — INSULIN ASPART SCH UNIT: 100 INJECTION, SOLUTION INTRAVENOUS; SUBCUTANEOUS at 17:19

## 2018-06-09 RX ADMIN — SODIUM CHLORIDE SCH MLS/HR: 9 INJECTION, SOLUTION INTRAVENOUS at 05:35

## 2018-06-09 RX ADMIN — ZINC OXIDE SCH: 200 OINTMENT TOPICAL at 16:10

## 2018-06-09 RX ADMIN — SIMETHICONE CHEW TAB 80 MG SCH MG: 80 TABLET ORAL at 22:40

## 2018-06-09 RX ADMIN — LOPINAVIR AND RITONAVIR SCH CAP: 200; 50 TABLET, FILM COATED ORAL at 10:09

## 2018-06-09 RX ADMIN — SIMETHICONE CHEW TAB 80 MG SCH MG: 80 TABLET ORAL at 17:18

## 2018-06-09 RX ADMIN — HYDROMORPHONE HYDROCHLORIDE PRN MG: 1 INJECTION, SOLUTION INTRAMUSCULAR; INTRAVENOUS; SUBCUTANEOUS at 14:27

## 2018-06-09 RX ADMIN — ZINC OXIDE SCH APPLIC: 200 OINTMENT TOPICAL at 00:41

## 2018-06-09 RX ADMIN — Medication SCH TAB: at 10:06

## 2018-06-09 RX ADMIN — EMTRICITABINE AND TENOFOVIR DISOPROXIL FUMARATE SCH TAB: 200; 300 TABLET, FILM COATED ORAL at 16:10

## 2018-06-09 RX ADMIN — OXYCODONE HYDROCHLORIDE AND ACETAMINOPHEN PRN TAB: 5; 325 TABLET ORAL at 17:16

## 2018-06-09 RX ADMIN — OXYCODONE HYDROCHLORIDE AND ACETAMINOPHEN PRN TAB: 5; 325 TABLET ORAL at 08:32

## 2018-06-09 RX ADMIN — HYDROMORPHONE HYDROCHLORIDE PRN MG: 1 INJECTION, SOLUTION INTRAMUSCULAR; INTRAVENOUS; SUBCUTANEOUS at 05:36

## 2018-06-09 RX ADMIN — LOPINAVIR AND RITONAVIR SCH CAP: 200; 50 TABLET, FILM COATED ORAL at 17:19

## 2018-06-09 RX ADMIN — INSULIN ASPART SCH: 100 INJECTION, SOLUTION INTRAVENOUS; SUBCUTANEOUS at 07:57

## 2018-06-09 RX ADMIN — ZINC OXIDE SCH APPLIC: 200 OINTMENT TOPICAL at 10:11

## 2018-06-09 RX ADMIN — HYDROMORPHONE HYDROCHLORIDE PRN MG: 1 INJECTION, SOLUTION INTRAMUSCULAR; INTRAVENOUS; SUBCUTANEOUS at 18:33

## 2018-06-10 RX ADMIN — INSULIN ASPART SCH: 100 INJECTION, SOLUTION INTRAVENOUS; SUBCUTANEOUS at 12:10

## 2018-06-10 RX ADMIN — INSULIN ASPART SCH: 100 INJECTION, SOLUTION INTRAVENOUS; SUBCUTANEOUS at 22:12

## 2018-06-10 RX ADMIN — INSULIN ASPART SCH: 100 INJECTION, SOLUTION INTRAVENOUS; SUBCUTANEOUS at 17:16

## 2018-06-10 RX ADMIN — SIMETHICONE CHEW TAB 80 MG SCH MG: 80 TABLET ORAL at 10:00

## 2018-06-10 RX ADMIN — ZINC OXIDE SCH APPLIC: 200 OINTMENT TOPICAL at 00:55

## 2018-06-10 RX ADMIN — HYDROMORPHONE HYDROCHLORIDE PRN MG: 1 INJECTION, SOLUTION INTRAMUSCULAR; INTRAVENOUS; SUBCUTANEOUS at 08:37

## 2018-06-10 RX ADMIN — SIMETHICONE CHEW TAB 80 MG SCH MG: 80 TABLET ORAL at 21:15

## 2018-06-10 RX ADMIN — ZINC OXIDE SCH APPLIC: 200 OINTMENT TOPICAL at 09:22

## 2018-06-10 RX ADMIN — INSULIN ASPART SCH: 100 INJECTION, SOLUTION INTRAVENOUS; SUBCUTANEOUS at 07:41

## 2018-06-10 RX ADMIN — ZINC OXIDE SCH APPLIC: 200 OINTMENT TOPICAL at 18:37

## 2018-06-10 RX ADMIN — SIMETHICONE CHEW TAB 80 MG SCH MG: 80 TABLET ORAL at 18:36

## 2018-06-10 RX ADMIN — LOPINAVIR AND RITONAVIR SCH CAP: 200; 50 TABLET, FILM COATED ORAL at 18:35

## 2018-06-10 RX ADMIN — LOPINAVIR AND RITONAVIR SCH CAP: 200; 50 TABLET, FILM COATED ORAL at 10:01

## 2018-06-10 RX ADMIN — HYDROMORPHONE HYDROCHLORIDE PRN MG: 1 INJECTION, SOLUTION INTRAMUSCULAR; INTRAVENOUS; SUBCUTANEOUS at 13:04

## 2018-06-10 RX ADMIN — Medication SCH TAB: at 10:00

## 2018-06-10 RX ADMIN — SIMETHICONE CHEW TAB 80 MG SCH MG: 80 TABLET ORAL at 13:04

## 2018-06-10 RX ADMIN — HYDROMORPHONE HYDROCHLORIDE PRN MG: 1 INJECTION, SOLUTION INTRAMUSCULAR; INTRAVENOUS; SUBCUTANEOUS at 03:33

## 2018-06-11 VITALS
SYSTOLIC BLOOD PRESSURE: 109 MMHG | DIASTOLIC BLOOD PRESSURE: 70 MMHG | OXYGEN SATURATION: 100 % | TEMPERATURE: 98.3 F | RESPIRATION RATE: 20 BRPM

## 2018-06-11 VITALS — HEART RATE: 91 BPM

## 2018-06-11 LAB
ALBUMIN SERPL-MCNC: 3 G/DL (ref 3.5–5)
ALBUMIN/GLOB SERPL: 0.8 {RATIO} (ref 1–2.1)
ALT SERPL-CCNC: 18 U/L (ref 9–52)
AST SERPL-CCNC: 39 U/L (ref 14–36)
BASOPHILS # BLD AUTO: 0 K/UL (ref 0–0.2)
BASOPHILS NFR BLD: 0.4 % (ref 0–2)
BUN SERPL-MCNC: 43 MG/DL (ref 7–17)
CALCIUM SERPL-MCNC: 8.1 MG/DL (ref 8.6–10.4)
EOSINOPHIL # BLD AUTO: 0.1 K/UL (ref 0–0.7)
EOSINOPHIL NFR BLD: 1 % (ref 0–4)
ERYTHROCYTE [DISTWIDTH] IN BLOOD BY AUTOMATED COUNT: 16.9 % (ref 11.5–14.5)
GFR NON-AFRICAN AMERICAN: 15
HGB BLD-MCNC: 9.4 G/DL (ref 11–16)
LYMPHOCYTES # BLD AUTO: 1.4 K/UL (ref 1–4.3)
LYMPHOCYTES NFR BLD AUTO: 12.2 % (ref 20–40)
MCH RBC QN AUTO: 30.2 PG (ref 27–31)
MCHC RBC AUTO-ENTMCNC: 33.1 G/DL (ref 33–37)
MCV RBC AUTO: 91.3 FL (ref 81–99)
MONOCYTES # BLD: 0.7 K/UL (ref 0–0.8)
MONOCYTES NFR BLD: 6.1 % (ref 0–10)
NEUTROPHILS # BLD: 9 K/UL (ref 1.8–7)
NEUTROPHILS NFR BLD AUTO: 80.3 % (ref 50–75)
NRBC BLD AUTO-RTO: 0 % (ref 0–2)
PLATELET # BLD: 597 K/UL (ref 130–400)
PMV BLD AUTO: 6.7 FL (ref 7.2–11.7)
RBC # BLD AUTO: 3.11 MIL/UL (ref 3.8–5.2)
WBC # BLD AUTO: 11.2 K/UL (ref 4.8–10.8)

## 2018-06-11 RX ADMIN — Medication SCH: at 10:00

## 2018-06-11 RX ADMIN — SIMETHICONE CHEW TAB 80 MG SCH: 80 TABLET ORAL at 14:24

## 2018-06-11 RX ADMIN — SIMETHICONE CHEW TAB 80 MG SCH: 80 TABLET ORAL at 10:00

## 2018-06-11 RX ADMIN — VANCOMYCIN HYDROCHLORIDE SCH MLS/HR: 1 INJECTION, POWDER, LYOPHILIZED, FOR SOLUTION INTRAVENOUS at 12:55

## 2018-06-11 RX ADMIN — ZINC OXIDE SCH APPLIC: 200 OINTMENT TOPICAL at 00:33

## 2018-06-11 RX ADMIN — INSULIN ASPART SCH: 100 INJECTION, SOLUTION INTRAVENOUS; SUBCUTANEOUS at 11:30

## 2018-06-11 RX ADMIN — LOPINAVIR AND RITONAVIR SCH: 200; 50 TABLET, FILM COATED ORAL at 10:00

## 2018-06-11 RX ADMIN — ZINC OXIDE SCH APPLIC: 200 OINTMENT TOPICAL at 08:48

## 2018-06-11 RX ADMIN — INSULIN ASPART SCH: 100 INJECTION, SOLUTION INTRAVENOUS; SUBCUTANEOUS at 07:45

## 2018-06-11 RX ADMIN — HYDROMORPHONE HYDROCHLORIDE PRN MG: 1 INJECTION, SOLUTION INTRAMUSCULAR; INTRAVENOUS; SUBCUTANEOUS at 01:11

## 2018-06-16 ENCOUNTER — HOSPITAL ENCOUNTER (EMERGENCY)
Dept: HOSPITAL 31 - C.ER | Age: 58
Discharge: HOME | End: 2018-06-16
Payer: MEDICARE

## 2018-06-16 VITALS — BODY MASS INDEX: 29.9 KG/M2

## 2018-06-16 VITALS
HEART RATE: 100 BPM | OXYGEN SATURATION: 97 % | TEMPERATURE: 98.9 F | SYSTOLIC BLOOD PRESSURE: 102 MMHG | DIASTOLIC BLOOD PRESSURE: 66 MMHG | RESPIRATION RATE: 19 BRPM
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
## 2018-06-16 DIAGNOSIS — S09.90XA: Primary | ICD-10-CM
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
## 2018-06-27 ENCOUNTER — HOSPITAL ENCOUNTER (INPATIENT)
Dept: HOSPITAL 31 - C.ER | Age: 58
LOS: 12 days | Discharge: NURSING, REHAB OR LONG-TERM CARE | DRG: 474 | End: 2018-07-09
Attending: INTERNAL MEDICINE | Admitting: INTERNAL MEDICINE
Payer: MEDICARE

## 2018-06-27 VITALS — BODY MASS INDEX: 29.9 KG/M2

## 2018-06-27 DIAGNOSIS — N18.6: ICD-10-CM

## 2018-06-27 DIAGNOSIS — Z91.81: ICD-10-CM

## 2018-06-27 DIAGNOSIS — I13.2: ICD-10-CM

## 2018-06-27 DIAGNOSIS — B20: ICD-10-CM

## 2018-06-27 DIAGNOSIS — I38: ICD-10-CM

## 2018-06-27 DIAGNOSIS — D63.8: ICD-10-CM

## 2018-06-27 DIAGNOSIS — Z87.891: ICD-10-CM

## 2018-06-27 DIAGNOSIS — H40.9: ICD-10-CM

## 2018-06-27 DIAGNOSIS — E11.52: ICD-10-CM

## 2018-06-27 DIAGNOSIS — Z79.4: ICD-10-CM

## 2018-06-27 DIAGNOSIS — A41.9: ICD-10-CM

## 2018-06-27 DIAGNOSIS — N25.81: ICD-10-CM

## 2018-06-27 DIAGNOSIS — T87.44: Primary | ICD-10-CM

## 2018-06-27 DIAGNOSIS — R65.21: ICD-10-CM

## 2018-06-27 DIAGNOSIS — E78.5: ICD-10-CM

## 2018-06-27 DIAGNOSIS — E03.9: ICD-10-CM

## 2018-06-27 DIAGNOSIS — F03.90: ICD-10-CM

## 2018-06-27 DIAGNOSIS — H54.62: ICD-10-CM

## 2018-06-27 DIAGNOSIS — G89.29: ICD-10-CM

## 2018-06-27 DIAGNOSIS — E11.40: ICD-10-CM

## 2018-06-27 DIAGNOSIS — I50.22: ICD-10-CM

## 2018-06-27 DIAGNOSIS — Z99.2: ICD-10-CM

## 2018-06-27 DIAGNOSIS — E83.52: ICD-10-CM

## 2018-06-27 DIAGNOSIS — Z89.511: ICD-10-CM

## 2018-06-27 DIAGNOSIS — D69.6: ICD-10-CM

## 2018-06-27 DIAGNOSIS — E11.22: ICD-10-CM

## 2018-06-27 LAB
ALBUMIN SERPL-MCNC: 3 [, G/DL] (ref 3.5–5)
ALBUMIN/GLOB SERPL: 0.7 [,] (ref 1–2.1)
ALT SERPL-CCNC: 20 [, U/L] (ref 9–52)
ANISOCYTOSIS BLD QL SMEAR: SLIGHT [,]
APTT BLD: 27 [, SECONDS] (ref 21–34)
ARTERIAL BLOOD GAS O2 SAT: 80.9 [, %] (ref 95–98)
ARTERIAL BLOOD GAS PCO2: 49 [, MM/HG] (ref 35–45)
ARTERIAL BLOOD GAS TCO2: 34 [, MMOL/L] (ref 22–28)
AST SERPL-CCNC: 35 [, U/L] (ref 14–36)
BASE EXCESS BLDV CALC-SCNC: 9.1 [, MMOL/L] (ref 0–2)
BASOPHILS # BLD AUTO: 0.1 [, K/UL] (ref 0–0.2)
BASOPHILS NFR BLD: 0.8 [, %] (ref 0–2)
BUN SERPL-MCNC: 35 [, MG/DL] (ref 7–17)
CALCIUM SERPL-MCNC: 10.8 [, MG/DL] (ref 8.6–10.4)
EOSINOPHIL # BLD AUTO: 0 [, K/UL] (ref 0–0.7)
EOSINOPHIL NFR BLD: 0.1 [, %] (ref 0–4)
ERYTHROCYTE [DISTWIDTH] IN BLOOD BY AUTOMATED COUNT: 22.3 [, %] (ref 11.5–14.5)
GFR NON-AFRICAN AMERICAN: 10 [,]
HCO3 BLDA-SCNC: 29.6 [, MMOL/L] (ref 21–28)
HGB BLD-MCNC: 6.4 [, G/DL] (ref 11–16)
HYPOCHROMIC: SLIGHT [,]
INHALED O2 CONCENTRATION: 32 [, %]
INR PPP: 1 [,]
LYMPHOCYTE: 6 [, %] (ref 20–40)
LYMPHOCYTES # BLD AUTO: 1 [, K/UL] (ref 1–4.3)
LYMPHOCYTES NFR BLD AUTO: 7.3 [, %] (ref 20–40)
MCH RBC QN AUTO: 30.9 [, PG] (ref 27–31)
MCHC RBC AUTO-ENTMCNC: 32.2 [, G/DL] (ref 33–37)
MCV RBC AUTO: 96.1 [, FL] (ref 81–99)
MONOCYTE: 6 [, %] (ref 0–10)
MONOCYTES # BLD: 1.2 [, K/UL] (ref 0–0.8)
MONOCYTES NFR BLD: 8.7 [, %] (ref 0–10)
NEUTROPHILS # BLD: 11.7 [, K/UL] (ref 1.8–7)
NEUTROPHILS NFR BLD AUTO: 76 [, %] (ref 50–75)
NEUTROPHILS NFR BLD AUTO: 83.1 [, %] (ref 50–75)
NEUTS BAND NFR BLD: 12 [, %] (ref 0–2)
NRBC BLD AUTO-RTO: 0.1 [, %] (ref 0–2)
PCO2 BLDV: 53 [, MMHG] (ref 40–60)
PH BLDA: 7.43 [,] (ref 7.35–7.45)
PH BLDV: 7.43 [,] (ref 7.32–7.43)
PLATELET # BLD EST: NORMAL [,]
PLATELET # BLD: 307 [, K/UL] (ref 130–400)
PMV BLD AUTO: 8.3 [, FL] (ref 7.2–11.7)
PO2 BLDA: 37 [, MM/HG] (ref 80–100)
POLYCHROMIC: SLIGHT [,]
PROTHROMBIN TIME: 11.1 [, SECONDS] (ref 9.7–12.2)
RBC # BLD AUTO: 2.08 [, MIL/UL] (ref 3.8–5.2)
TOTAL CELLS COUNTED BLD: 100 [,]
TROPONIN I SERPL-MCNC: 0.06 [, NG/ML] (ref 0–0.12)
VENOUS BLOOD GAS PO2: 25 [, MM/HG] (ref 30–55)
WBC # BLD AUTO: 14 [, K/UL] (ref 4.8–10.8)

## 2018-06-27 RX ADMIN — TAZOBACTAM SODIUM AND PIPERACILLIN SODIUM SCH: 250; 2 INJECTION, SOLUTION INTRAVENOUS at 14:30

## 2018-06-27 RX ADMIN — SIMETHICONE CHEW TAB 80 MG SCH: 80 TABLET ORAL at 22:18

## 2018-06-27 RX ADMIN — SIMETHICONE CHEW TAB 80 MG SCH: 80 TABLET ORAL at 18:00

## 2018-06-27 RX ADMIN — EMTRICITABINE AND TENOFOVIR DISOPROXIL FUMARATE SCH: 200; 300 TABLET, FILM COATED ORAL at 14:30

## 2018-06-27 RX ADMIN — HUMAN INSULIN SCH: 100 INJECTION, SOLUTION SUBCUTANEOUS at 23:52

## 2018-06-27 RX ADMIN — TAZOBACTAM SODIUM AND PIPERACILLIN SODIUM SCH MLS/HR: 250; 2 INJECTION, SOLUTION INTRAVENOUS at 22:00

## 2018-06-28 LAB
ALBUMIN SERPL-MCNC: 2.3 [, G/DL] (ref 3.5–5)
ALBUMIN SERPL-MCNC: 3 [, G/DL] (ref 3.5–5)
ALBUMIN/GLOB SERPL: 0.7 [,] (ref 1–2.1)
ALBUMIN/GLOB SERPL: 0.7 [,] (ref 1–2.1)
ALT SERPL-CCNC: 14 [, U/L] (ref 9–52)
ALT SERPL-CCNC: 19 [, U/L] (ref 9–52)
ANISOCYTOSIS BLD QL SMEAR: (no result) [,]
ANISOCYTOSIS BLD QL SMEAR: (no result) [,]
AST SERPL-CCNC: 29 [, U/L] (ref 14–36)
AST SERPL-CCNC: 32 [, U/L] (ref 14–36)
BASOPHILS # BLD AUTO: 0 [, K/UL] (ref 0–0.2)
BASOPHILS # BLD AUTO: 0.1 [, K/UL] (ref 0–0.2)
BASOPHILS NFR BLD: 0.3 [, %] (ref 0–2)
BASOPHILS NFR BLD: 0.6 [, %] (ref 0–2)
BUN SERPL-MCNC: 40 [, MG/DL] (ref 7–17)
BUN SERPL-MCNC: 42 [, MG/DL] (ref 7–17)
CALCIUM SERPL-MCNC: 10.8 [, MG/DL] (ref 8.6–10.4)
CALCIUM SERPL-MCNC: 9.2 [, MG/DL] (ref 8.6–10.4)
EOSINOPHIL # BLD AUTO: 0 [, K/UL] (ref 0–0.7)
EOSINOPHIL # BLD AUTO: 0 [, K/UL] (ref 0–0.7)
EOSINOPHIL NFR BLD: 0.2 [, %] (ref 0–4)
EOSINOPHIL NFR BLD: 0.3 [, %] (ref 0–4)
ERYTHROCYTE [DISTWIDTH] IN BLOOD BY AUTOMATED COUNT: 18.9 [, %] (ref 11.5–14.5)
ERYTHROCYTE [DISTWIDTH] IN BLOOD BY AUTOMATED COUNT: 20.9 [, %] (ref 11.5–14.5)
GFR NON-AFRICAN AMERICAN: 11 [,]
GFR NON-AFRICAN AMERICAN: 9 [,]
HGB BLD-MCNC: 10 [, G/DL] (ref 11–16)
HGB BLD-MCNC: 10.5 [, G/DL] (ref 11–16)
HYPOCHROMIC: SLIGHT [,]
LYMPHOCYTE: 10 [, %] (ref 20–40)
LYMPHOCYTE: 4 [, %] (ref 20–40)
LYMPHOCYTES # BLD AUTO: 0.7 [, K/UL] (ref 1–4.3)
LYMPHOCYTES # BLD AUTO: 1 [, K/UL] (ref 1–4.3)
LYMPHOCYTES NFR BLD AUTO: 5.1 [, %] (ref 20–40)
LYMPHOCYTES NFR BLD AUTO: 6.4 [, %] (ref 20–40)
MCH RBC QN AUTO: 29 [, PG] (ref 27–31)
MCH RBC QN AUTO: 29.1 [, PG] (ref 27–31)
MCHC RBC AUTO-ENTMCNC: 32.4 [, G/DL] (ref 33–37)
MCHC RBC AUTO-ENTMCNC: 33 [, G/DL] (ref 33–37)
MCV RBC AUTO: 88.3 [, FL] (ref 81–99)
MCV RBC AUTO: 89.6 [, FL] (ref 81–99)
MONOCYTE: 4 [, %] (ref 0–10)
MONOCYTE: 7 [, %] (ref 0–10)
MONOCYTES # BLD: 0.8 [, K/UL] (ref 0–0.8)
MONOCYTES # BLD: 0.9 [, K/UL] (ref 0–0.8)
MONOCYTES NFR BLD: 6.3 [, %] (ref 0–10)
MONOCYTES NFR BLD: 6.4 [, %] (ref 0–10)
MYELOCYTES NFR BLD: 1 [, %] (ref 0–0)
MYELOCYTES NFR BLD: 2 [, %] (ref 0–0)
NEUTROPHILS # BLD: 11.7 [, K/UL] (ref 1.8–7)
NEUTROPHILS # BLD: 12.9 [, K/UL] (ref 1.8–7)
NEUTROPHILS NFR BLD AUTO: 60 [, %] (ref 50–75)
NEUTROPHILS NFR BLD AUTO: 71 [, %] (ref 50–75)
NEUTROPHILS NFR BLD AUTO: 86.7 [, %] (ref 50–75)
NEUTROPHILS NFR BLD AUTO: 87.7 [, %] (ref 50–75)
NEUTS BAND NFR BLD: 17 [, %] (ref 0–2)
NEUTS BAND NFR BLD: 24 [, %] (ref 0–2)
NRBC BLD AUTO-RTO: 0.1 [, %] (ref 0–2)
NRBC BLD AUTO-RTO: 0.2 [, %] (ref 0–2)
PLATELET # BLD EST: NORMAL [,]
PLATELET # BLD EST: NORMAL [,]
PLATELET # BLD: 327 [, K/UL] (ref 130–400)
PLATELET # BLD: 330 [, K/UL] (ref 130–400)
PMV BLD AUTO: 7.8 [, FL] (ref 7.2–11.7)
PMV BLD AUTO: 8 [, FL] (ref 7.2–11.7)
RBC # BLD AUTO: 3.42 [, MIL/UL] (ref 3.8–5.2)
RBC # BLD AUTO: 3.63 [, MIL/UL] (ref 3.8–5.2)
TARGETS BLD QL SMEAR: SLIGHT [,]
TOTAL CELLS COUNTED BLD: 100 [,]
TOTAL CELLS COUNTED BLD: 100 [,]
TOXIC GRANULES BLD QL SMEAR: PRESENT [,]
TROPONIN I SERPL-MCNC: 0.06 [, NG/ML] (ref 0–0.12)
WBC # BLD AUTO: 13.3 [, K/UL] (ref 4.8–10.8)
WBC # BLD AUTO: 14.8 [, K/UL] (ref 4.8–10.8)

## 2018-06-28 PROCEDURE — 0Y6J0Z1 DETACHMENT AT LEFT LOWER LEG, HIGH, OPEN APPROACH: ICD-10-PCS | Performed by: SURGERY

## 2018-06-28 RX ADMIN — Medication SCH: at 11:43

## 2018-06-28 RX ADMIN — SIMETHICONE CHEW TAB 80 MG SCH: 80 TABLET ORAL at 11:43

## 2018-06-28 RX ADMIN — SIMETHICONE CHEW TAB 80 MG SCH: 80 TABLET ORAL at 18:40

## 2018-06-28 RX ADMIN — TAZOBACTAM SODIUM AND PIPERACILLIN SODIUM SCH MLS/HR: 250; 2 INJECTION, SOLUTION INTRAVENOUS at 04:30

## 2018-06-28 RX ADMIN — HUMAN INSULIN SCH: 100 INJECTION, SOLUTION SUBCUTANEOUS at 18:39

## 2018-06-28 RX ADMIN — HUMAN INSULIN SCH: 100 INJECTION, SOLUTION SUBCUTANEOUS at 06:00

## 2018-06-28 RX ADMIN — HUMAN INSULIN SCH U: 100 INJECTION, SOLUTION SUBCUTANEOUS at 18:53

## 2018-06-28 RX ADMIN — SIMETHICONE CHEW TAB 80 MG SCH: 80 TABLET ORAL at 22:04

## 2018-06-29 LAB
ALBUMIN SERPL-MCNC: 1.7 [, G/DL] (ref 3.5–5)
ALBUMIN/GLOB SERPL: 0.6 [,] (ref 1–2.1)
ALT SERPL-CCNC: 21 [, U/L] (ref 9–52)
ANISOCYTOSIS BLD QL SMEAR: SLIGHT [,]
AST SERPL-CCNC: 18 [, U/L] (ref 14–36)
BASE EXCESS BLDV CALC-SCNC: 2 [, MMOL/L] (ref 0–2)
BASOPHILS # BLD AUTO: 0 [, K/UL] (ref 0–0.2)
BASOPHILS NFR BLD: 0 [, %] (ref 0–2)
BUN SERPL-MCNC: 37 [, MG/DL] (ref 7–17)
CALCIUM SERPL-MCNC: 7 [, MG/DL] (ref 8.6–10.4)
EOSINOPHIL # BLD AUTO: 0 [, K/UL] (ref 0–0.7)
EOSINOPHIL NFR BLD: 0 [, %] (ref 0–4)
ERYTHROCYTE [DISTWIDTH] IN BLOOD BY AUTOMATED COUNT: 17.9 [, %] (ref 11.5–14.5)
GFR NON-AFRICAN AMERICAN: 12 [,]
HGB BLD-MCNC: 10.1 [, G/DL] (ref 11–16)
HYPOCHROMIC: SLIGHT [,]
LYMPHOCYTE: 4 [, %] (ref 20–40)
LYMPHOCYTES # BLD AUTO: 0.6 [, K/UL] (ref 1–4.3)
LYMPHOCYTES NFR BLD AUTO: 3 [, %] (ref 20–40)
MCH RBC QN AUTO: 29.7 [, PG] (ref 27–31)
MCHC RBC AUTO-ENTMCNC: 33.3 [, G/DL] (ref 33–37)
MCV RBC AUTO: 89.1 [, FL] (ref 81–99)
MONOCYTE: 6 [, %] (ref 0–10)
MONOCYTES # BLD: 1.2 [, K/UL] (ref 0–0.8)
MONOCYTES NFR BLD: 6 [, %] (ref 0–10)
NEUTROPHILS # BLD: 17 [, K/UL] (ref 1.8–7)
NEUTROPHILS NFR BLD AUTO: 90 [, %] (ref 50–75)
NEUTROPHILS NFR BLD AUTO: 91 [, %] (ref 50–75)
NRBC BLD AUTO-RTO: 0 [, %] (ref 0–2)
PCO2 BLDV: 47 [, MMHG] (ref 40–60)
PH BLDV: 7.38 [,] (ref 7.32–7.43)
PLATELET # BLD EST: NORMAL [,]
PLATELET # BLD: 306 [, K/UL] (ref 130–400)
PMV BLD AUTO: 8 [, FL] (ref 7.2–11.7)
POLYCHROMIC: SLIGHT [,]
RBC # BLD AUTO: 3.42 [, MIL/UL] (ref 3.8–5.2)
TOTAL CELLS COUNTED BLD: 100 [,]
VENOUS BLOOD GAS PO2: 34 [, MM/HG] (ref 30–55)
WBC # BLD AUTO: 19 [, K/UL] (ref 4.8–10.8)

## 2018-06-29 RX ADMIN — VANCOMYCIN HYDROCHLORIDE SCH MLS/HR: 1 INJECTION, POWDER, LYOPHILIZED, FOR SOLUTION INTRAVENOUS at 10:23

## 2018-06-29 RX ADMIN — SIMETHICONE CHEW TAB 80 MG SCH: 80 TABLET ORAL at 14:38

## 2018-06-29 RX ADMIN — HUMAN INSULIN SCH U: 100 INJECTION, SOLUTION SUBCUTANEOUS at 06:01

## 2018-06-29 RX ADMIN — HUMAN INSULIN SCH U: 100 INJECTION, SOLUTION SUBCUTANEOUS at 17:35

## 2018-06-29 RX ADMIN — Medication SCH TAB: at 10:30

## 2018-06-29 RX ADMIN — HUMAN INSULIN SCH U: 100 INJECTION, SOLUTION SUBCUTANEOUS at 00:13

## 2018-06-29 RX ADMIN — SIMETHICONE CHEW TAB 80 MG SCH MG: 80 TABLET ORAL at 21:35

## 2018-06-29 RX ADMIN — ERYTHROPOIETIN SCH UNIT: 10000 INJECTION, SOLUTION INTRAVENOUS; SUBCUTANEOUS at 17:15

## 2018-06-29 RX ADMIN — HUMAN INSULIN SCH U: 100 INJECTION, SOLUTION SUBCUTANEOUS at 12:35

## 2018-06-29 RX ADMIN — SIMETHICONE CHEW TAB 80 MG SCH MG: 80 TABLET ORAL at 10:06

## 2018-06-29 RX ADMIN — EMTRICITABINE AND TENOFOVIR DISOPROXIL FUMARATE SCH TAB: 200; 300 TABLET, FILM COATED ORAL at 14:44

## 2018-06-29 RX ADMIN — SIMETHICONE CHEW TAB 80 MG SCH MG: 80 TABLET ORAL at 17:33

## 2018-06-30 LAB
ALBUMIN SERPL-MCNC: 2.6 [, G/DL] (ref 3.5–5)
ALBUMIN/GLOB SERPL: 0.7 [,] (ref 1–2.1)
ALT SERPL-CCNC: 11 [, U/L] (ref 9–52)
ANISOCYTOSIS BLD QL SMEAR: (no result) [,]
AST SERPL-CCNC: 35 [, U/L] (ref 14–36)
BASE EXCESS BLDV CALC-SCNC: 1.7 [, MMOL/L] (ref 0–2)
BASOPHILS # BLD AUTO: 0.2 [, K/UL] (ref 0–0.2)
BASOPHILS NFR BLD: 1.2 [, %] (ref 0–2)
BUN SERPL-MCNC: 62 [, MG/DL] (ref 7–17)
CALCIUM SERPL-MCNC: 10.2 [, MG/DL] (ref 8.6–10.4)
EOSINOPHIL # BLD AUTO: 0 [, K/UL] (ref 0–0.7)
EOSINOPHIL NFR BLD: 0 [, %] (ref 0–4)
EOSINOPHIL NFR BLD: 1 [, %] (ref 0–4)
ERYTHROCYTE [DISTWIDTH] IN BLOOD BY AUTOMATED COUNT: 18.3 [, %] (ref 11.5–14.5)
GFR NON-AFRICAN AMERICAN: 8 [,]
HGB BLD-MCNC: 9.7 [, G/DL] (ref 11–16)
HYPOCHROMIC: SLIGHT [,]
LG PLATELETS BLD QL SMEAR: PRESENT [,]
LYMPHOCYTE: 3 [, %] (ref 20–40)
LYMPHOCYTES # BLD AUTO: 0.5 [, K/UL] (ref 1–4.3)
LYMPHOCYTES NFR BLD AUTO: 2.8 [, %] (ref 20–40)
MCH RBC QN AUTO: 29 [, PG] (ref 27–31)
MCHC RBC AUTO-ENTMCNC: 32.3 [, G/DL] (ref 33–37)
MCV RBC AUTO: 89.8 [, FL] (ref 81–99)
MONOCYTE: 2 [, %] (ref 0–10)
MONOCYTES # BLD: 0.5 [, K/UL] (ref 0–0.8)
MONOCYTES NFR BLD: 3 [, %] (ref 0–10)
NEUTROPHILS # BLD: 16.7 [, K/UL] (ref 1.8–7)
NEUTROPHILS NFR BLD AUTO: 93 [, %] (ref 50–75)
NEUTROPHILS NFR BLD AUTO: 93 [, %] (ref 50–75)
NEUTS BAND NFR BLD: 1 [, %] (ref 0–2)
NRBC BLD AUTO-RTO: 0.1 [, %] (ref 0–2)
PCO2 BLDV: 48 [, MMHG] (ref 40–60)
PH BLDV: 7.37 [,] (ref 7.32–7.43)
PLATELET # BLD EST: NORMAL [,]
PLATELET # BLD: 285 [, K/UL] (ref 130–400)
PMV BLD AUTO: 8.3 [, FL] (ref 7.2–11.7)
POLYCHROMIC: SLIGHT [,]
RBC # BLD AUTO: 3.35 [, MIL/UL] (ref 3.8–5.2)
TOTAL CELLS COUNTED BLD: 100 [,]
VENOUS BLOOD GAS PO2: 36 [, MM/HG] (ref 30–55)
WBC # BLD AUTO: 18 [, K/UL] (ref 4.8–10.8)

## 2018-06-30 RX ADMIN — SIMETHICONE CHEW TAB 80 MG SCH MG: 80 TABLET ORAL at 09:48

## 2018-06-30 RX ADMIN — HUMAN INSULIN SCH U: 100 INJECTION, SOLUTION SUBCUTANEOUS at 12:46

## 2018-06-30 RX ADMIN — HUMAN INSULIN SCH U: 100 INJECTION, SOLUTION SUBCUTANEOUS at 00:15

## 2018-06-30 RX ADMIN — HUMAN INSULIN SCH U: 100 INJECTION, SOLUTION SUBCUTANEOUS at 18:05

## 2018-06-30 RX ADMIN — Medication SCH TAB: at 09:48

## 2018-06-30 RX ADMIN — SIMETHICONE CHEW TAB 80 MG SCH MG: 80 TABLET ORAL at 13:48

## 2018-06-30 RX ADMIN — SIMETHICONE CHEW TAB 80 MG SCH MG: 80 TABLET ORAL at 19:06

## 2018-06-30 RX ADMIN — HYDROMORPHONE HYDROCHLORIDE PRN MG: 1 INJECTION, SOLUTION INTRAMUSCULAR; INTRAVENOUS; SUBCUTANEOUS at 19:04

## 2018-06-30 RX ADMIN — SIMETHICONE CHEW TAB 80 MG SCH MG: 80 TABLET ORAL at 21:31

## 2018-06-30 RX ADMIN — HUMAN INSULIN SCH U: 100 INJECTION, SOLUTION SUBCUTANEOUS at 05:34

## 2018-06-30 RX ADMIN — HYDROMORPHONE HYDROCHLORIDE PRN MG: 1 INJECTION, SOLUTION INTRAMUSCULAR; INTRAVENOUS; SUBCUTANEOUS at 09:57

## 2018-07-01 LAB
ALBUMIN SERPL-MCNC: 2.3 [, G/DL] (ref 3.5–5)
ALBUMIN/GLOB SERPL: 0.7 [,] (ref 1–2.1)
ALT SERPL-CCNC: 18 [, U/L] (ref 9–52)
ANISOCYTOSIS BLD QL SMEAR: SLIGHT [,]
AST SERPL-CCNC: 34 [, U/L] (ref 14–36)
BASO STIPL BLD QL SMEAR: SLIGHT [,]
BASOPHILS # BLD AUTO: 0 [, K/UL] (ref 0–0.2)
BASOPHILS NFR BLD: 0.2 [, %] (ref 0–2)
BUN SERPL-MCNC: 72 [, MG/DL] (ref 7–17)
CALCIUM SERPL-MCNC: 9.7 [, MG/DL] (ref 8.6–10.4)
EOSINOPHIL # BLD AUTO: 0 [, K/UL] (ref 0–0.7)
EOSINOPHIL NFR BLD: 0 [, %] (ref 0–4)
ERYTHROCYTE [DISTWIDTH] IN BLOOD BY AUTOMATED COUNT: 17.5 [, %] (ref 11.5–14.5)
GFR NON-AFRICAN AMERICAN: 8 [,]
HGB BLD-MCNC: 9.2 [, G/DL] (ref 11–16)
HYPOCHROMIC: SLIGHT [,]
LG PLATELETS BLD QL SMEAR: PRESENT [,]
LYMPHOCYTE: 5 [, %] (ref 20–40)
LYMPHOCYTES # BLD AUTO: 0.6 [, K/UL] (ref 1–4.3)
LYMPHOCYTES NFR BLD AUTO: 3.6 [, %] (ref 20–40)
MCH RBC QN AUTO: 29.2 [, PG] (ref 27–31)
MCHC RBC AUTO-ENTMCNC: 32.4 [, G/DL] (ref 33–37)
MCV RBC AUTO: 89.9 [, FL] (ref 81–99)
MONOCYTE: 3 [, %] (ref 0–10)
MONOCYTES # BLD: 0.7 [, K/UL] (ref 0–0.8)
MONOCYTES NFR BLD: 4 [, %] (ref 0–10)
NEUTROPHILS # BLD: 16.5 [, K/UL] (ref 1.8–7)
NEUTROPHILS NFR BLD AUTO: 90 [, %] (ref 50–75)
NEUTROPHILS NFR BLD AUTO: 92.2 [, %] (ref 50–75)
NEUTS BAND NFR BLD: 2 [, %] (ref 0–2)
NRBC BLD AUTO-RTO: 0.2 [, %] (ref 0–2)
PLATELET # BLD EST: NORMAL [,]
PLATELET # BLD: 376 [, K/UL] (ref 130–400)
PMV BLD AUTO: 8.2 [, FL] (ref 7.2–11.7)
POLYCHROMIC: SLIGHT [,]
RBC # BLD AUTO: 3.14 [, MIL/UL] (ref 3.8–5.2)
TOTAL CELLS COUNTED BLD: 100 [,]
WBC # BLD AUTO: 17.9 [, K/UL] (ref 4.8–10.8)

## 2018-07-01 RX ADMIN — HUMAN INSULIN SCH U: 100 INJECTION, SOLUTION SUBCUTANEOUS at 06:28

## 2018-07-01 RX ADMIN — Medication SCH CAP: at 10:21

## 2018-07-01 RX ADMIN — SIMETHICONE CHEW TAB 80 MG SCH MG: 80 TABLET ORAL at 22:00

## 2018-07-01 RX ADMIN — SIMETHICONE CHEW TAB 80 MG SCH MG: 80 TABLET ORAL at 14:00

## 2018-07-01 RX ADMIN — HUMAN INSULIN SCH U: 100 INJECTION, SOLUTION SUBCUTANEOUS at 18:20

## 2018-07-01 RX ADMIN — EMTRICITABINE AND TENOFOVIR DISOPROXIL FUMARATE SCH TAB: 200; 300 TABLET, FILM COATED ORAL at 14:16

## 2018-07-01 RX ADMIN — HYDROMORPHONE HYDROCHLORIDE PRN MG: 1 INJECTION, SOLUTION INTRAMUSCULAR; INTRAVENOUS; SUBCUTANEOUS at 05:41

## 2018-07-01 RX ADMIN — HUMAN INSULIN SCH U: 100 INJECTION, SOLUTION SUBCUTANEOUS at 12:20

## 2018-07-01 RX ADMIN — SIMETHICONE CHEW TAB 80 MG SCH MG: 80 TABLET ORAL at 10:21

## 2018-07-01 RX ADMIN — Medication SCH TAB: at 10:21

## 2018-07-01 RX ADMIN — SIMETHICONE CHEW TAB 80 MG SCH MG: 80 TABLET ORAL at 19:19

## 2018-07-01 RX ADMIN — HUMAN INSULIN SCH U: 100 INJECTION, SOLUTION SUBCUTANEOUS at 00:56

## 2018-07-01 RX ADMIN — HYDROMORPHONE HYDROCHLORIDE PRN MG: 1 INJECTION, SOLUTION INTRAMUSCULAR; INTRAVENOUS; SUBCUTANEOUS at 20:05

## 2018-07-01 RX ADMIN — Medication SCH CAP: at 18:19

## 2018-07-02 LAB
ALBUMIN SERPL-MCNC: 2.8 [, G/DL] (ref 3.5–5)
ALBUMIN/GLOB SERPL: 0.7 [,] (ref 1–2.1)
ALT SERPL-CCNC: 37 [, U/L] (ref 9–52)
ANISOCYTOSIS BLD QL SMEAR: SLIGHT [,]
AST SERPL-CCNC: 66 [, U/L] (ref 14–36)
BASOPHILS # BLD AUTO: 0.5 [, K/UL] (ref 0–0.2)
BASOPHILS NFR BLD: 3.1 [, %] (ref 0–2)
BUN SERPL-MCNC: 97 [, MG/DL] (ref 7–17)
CALCIUM SERPL-MCNC: 10.8 [, MG/DL] (ref 8.6–10.4)
EOSINOPHIL # BLD AUTO: 0 [, K/UL] (ref 0–0.7)
EOSINOPHIL NFR BLD: 0 [, %] (ref 0–4)
ERYTHROCYTE [DISTWIDTH] IN BLOOD BY AUTOMATED COUNT: 17.8 [, %] (ref 11.5–14.5)
GFR NON-AFRICAN AMERICAN: 8 [,]
HGB BLD-MCNC: 10.1 [, G/DL] (ref 11–16)
HYPOCHROMIC: SLIGHT [,]
LYMPHOCYTE: 3 [, %] (ref 20–40)
LYMPHOCYTES # BLD AUTO: 0.6 [, K/UL] (ref 1–4.3)
LYMPHOCYTES NFR BLD AUTO: 3.8 [, %] (ref 20–40)
MCH RBC QN AUTO: 29.7 [, PG] (ref 27–31)
MCHC RBC AUTO-ENTMCNC: 32.6 [, G/DL] (ref 33–37)
MCV RBC AUTO: 91.3 [, FL] (ref 81–99)
MONOCYTE: 4 [, %] (ref 0–10)
MONOCYTES # BLD: 0.7 [, K/UL] (ref 0–0.8)
MONOCYTES NFR BLD: 4.2 [, %] (ref 0–10)
NEUTROPHILS # BLD: 14.9 [, K/UL] (ref 1.8–7)
NEUTROPHILS NFR BLD AUTO: 88.9 [, %] (ref 50–75)
NEUTROPHILS NFR BLD AUTO: 90 [, %] (ref 50–75)
NEUTS BAND NFR BLD: 3 [, %] (ref 0–2)
NRBC BLD AUTO-RTO: 0.4 [, %] (ref 0–2)
PLATELET # BLD EST: NORMAL [,]
PLATELET # BLD: 359 [, K/UL] (ref 130–400)
PMV BLD AUTO: 8.4 [, FL] (ref 7.2–11.7)
POLYCHROMIC: SLIGHT [,]
RBC # BLD AUTO: 3.4 [, MIL/UL] (ref 3.8–5.2)
TOTAL CELLS COUNTED BLD: 100 [,]
WBC # BLD AUTO: 16.7 [, K/UL] (ref 4.8–10.8)

## 2018-07-02 PROCEDURE — 5A1D70Z PERFORMANCE OF URINARY FILTRATION, INTERMITTENT, LESS THAN 6 HOURS PER DAY: ICD-10-PCS

## 2018-07-02 RX ADMIN — Medication SCH CAP: at 09:13

## 2018-07-02 RX ADMIN — SEVELAMER CARBONATE SCH GM: 800 POWDER, FOR SUSPENSION ORAL at 11:40

## 2018-07-02 RX ADMIN — ALBUMIN (HUMAN) SCH GM: 12.5 INJECTION, SOLUTION INTRAVENOUS at 19:19

## 2018-07-02 RX ADMIN — HYDROMORPHONE HYDROCHLORIDE PRN MG: 1 INJECTION, SOLUTION INTRAMUSCULAR; INTRAVENOUS; SUBCUTANEOUS at 14:09

## 2018-07-02 RX ADMIN — HUMAN INSULIN SCH U: 100 INJECTION, SOLUTION SUBCUTANEOUS at 11:40

## 2018-07-02 RX ADMIN — ERYTHROPOIETIN SCH UNIT: 10000 INJECTION, SOLUTION INTRAVENOUS; SUBCUTANEOUS at 20:30

## 2018-07-02 RX ADMIN — HUMAN INSULIN SCH U: 100 INJECTION, SOLUTION SUBCUTANEOUS at 06:05

## 2018-07-02 RX ADMIN — SEVELAMER CARBONATE SCH GM: 800 POWDER, FOR SUSPENSION ORAL at 10:00

## 2018-07-02 RX ADMIN — HYDROMORPHONE HYDROCHLORIDE PRN MG: 1 INJECTION, SOLUTION INTRAMUSCULAR; INTRAVENOUS; SUBCUTANEOUS at 07:32

## 2018-07-02 RX ADMIN — HYDROMORPHONE HYDROCHLORIDE PRN MG: 1 INJECTION, SOLUTION INTRAMUSCULAR; INTRAVENOUS; SUBCUTANEOUS at 21:30

## 2018-07-02 RX ADMIN — SEVELAMER CARBONATE SCH GM: 800 POWDER, FOR SUSPENSION ORAL at 17:02

## 2018-07-02 RX ADMIN — HUMAN INSULIN SCH U: 100 INJECTION, SOLUTION SUBCUTANEOUS at 00:20

## 2018-07-02 RX ADMIN — ALBUMIN (HUMAN) SCH GM: 12.5 INJECTION, SOLUTION INTRAVENOUS at 18:35

## 2018-07-02 RX ADMIN — Medication SCH CAP: at 17:03

## 2018-07-02 RX ADMIN — Medication SCH TAB: at 10:00

## 2018-07-02 RX ADMIN — HUMAN INSULIN SCH U: 100 INJECTION, SOLUTION SUBCUTANEOUS at 17:49

## 2018-07-02 RX ADMIN — VANCOMYCIN HYDROCHLORIDE SCH MLS/HR: 1 INJECTION, POWDER, LYOPHILIZED, FOR SOLUTION INTRAVENOUS at 09:04

## 2018-07-03 LAB
% CD4 (T HELPER CELL): 11 [, PERCENT] (ref 30–61)
% CD8 (SUPPRESSOR T CELL): 36 [, PERCENT] (ref 12–42)
ABSOLUTE CD3 CELLS: 284 [, CELLS/MCL] (ref 840–3060)
ABSOLUTE CD4 CELLS: 67 [, CELLS/MCL] (ref 490–1740)
ABSOLUTE CD8 CELLS: 216 [, CELLS/MCL] (ref 180–1170)
ABSOLUTE LYMPHOCYTES: 593 [, CELLS/MCL] (ref 850–3900)
ALBUMIN SERPL-MCNC: 2.8 [, G/DL] (ref 3.5–5)
ALBUMIN/GLOB SERPL: 0.8 [,] (ref 1–2.1)
ALT SERPL-CCNC: 45 [, U/L] (ref 9–52)
AST SERPL-CCNC: 84 [, U/L] (ref 14–36)
BUN SERPL-MCNC: 71 [, MG/DL] (ref 7–17)
CALCIUM SERPL-MCNC: 9.8 [, MG/DL] (ref 8.6–10.4)
ERYTHROCYTE [DISTWIDTH] IN BLOOD BY AUTOMATED COUNT: 17.7 [, %] (ref 11.5–14.5)
GFR NON-AFRICAN AMERICAN: 11 [,]
HELPER/SUPPRESSOR RATIO: 0.31 (ref 0.86–5)
HGB BLD-MCNC: 9.4 [, G/DL] (ref 11–16)
MCH RBC QN AUTO: 30 [, PG] (ref 27–31)
MCHC RBC AUTO-ENTMCNC: 33.1 [, G/DL] (ref 33–37)
MCV RBC AUTO: 90.4 [, FL] (ref 81–99)
PLATELET # BLD: 317 [, K/UL] (ref 130–400)
PMV BLD AUTO: 8.5 [, FL] (ref 7.2–11.7)
RBC # BLD AUTO: 3.13 [, MIL/UL] (ref 3.8–5.2)
WBC # BLD AUTO: 15.8 [, K/UL] (ref 4.8–10.8)

## 2018-07-03 RX ADMIN — HUMAN INSULIN SCH U: 100 INJECTION, SOLUTION SUBCUTANEOUS at 00:30

## 2018-07-03 RX ADMIN — HUMAN INSULIN SCH: 100 INJECTION, SOLUTION SUBCUTANEOUS at 18:14

## 2018-07-03 RX ADMIN — SEVELAMER CARBONATE SCH GM: 800 POWDER, FOR SUSPENSION ORAL at 12:12

## 2018-07-03 RX ADMIN — HUMAN INSULIN SCH U: 100 INJECTION, SOLUTION SUBCUTANEOUS at 05:45

## 2018-07-03 RX ADMIN — Medication SCH CAP: at 18:13

## 2018-07-03 RX ADMIN — EMTRICITABINE AND TENOFOVIR DISOPROXIL FUMARATE SCH TAB: 200; 300 TABLET, FILM COATED ORAL at 18:14

## 2018-07-03 RX ADMIN — HYDROMORPHONE HYDROCHLORIDE PRN MG: 1 INJECTION, SOLUTION INTRAMUSCULAR; INTRAVENOUS; SUBCUTANEOUS at 05:45

## 2018-07-03 RX ADMIN — Medication SCH CAP: at 10:20

## 2018-07-03 RX ADMIN — SEVELAMER CARBONATE SCH GM: 800 POWDER, FOR SUSPENSION ORAL at 18:13

## 2018-07-03 RX ADMIN — Medication SCH TAB: at 10:21

## 2018-07-03 RX ADMIN — SEVELAMER CARBONATE SCH GM: 800 POWDER, FOR SUSPENSION ORAL at 10:21

## 2018-07-04 LAB
ALBUMIN SERPL-MCNC: 2.8 [, G/DL] (ref 3.5–5)
ALBUMIN/GLOB SERPL: 0.8 [,] (ref 1–2.1)
ALT SERPL-CCNC: 39 [, U/L] (ref 9–52)
ANISOCYTOSIS BLD QL SMEAR: SLIGHT [,]
AST SERPL-CCNC: 67 [, U/L] (ref 14–36)
BASOPHILS # BLD AUTO: 0 [, K/UL] (ref 0–0.2)
BASOPHILS NFR BLD: 0.3 [, %] (ref 0–2)
BUN SERPL-MCNC: 84 [, MG/DL] (ref 7–17)
CALCIUM SERPL-MCNC: 9.4 [, MG/DL] (ref 8.6–10.4)
EOSINOPHIL # BLD AUTO: 0.4 [, K/UL] (ref 0–0.7)
EOSINOPHIL NFR BLD: 2.4 [, %] (ref 0–4)
ERYTHROCYTE [DISTWIDTH] IN BLOOD BY AUTOMATED COUNT: 17.4 [, %] (ref 11.5–14.5)
GFR NON-AFRICAN AMERICAN: 9 [,]
HGB BLD-MCNC: 9.6 [, G/DL] (ref 11–16)
LG PLATELETS BLD QL SMEAR: PRESENT [,]
LYMPHOCYTE: 8 [, %] (ref 20–40)
LYMPHOCYTES # BLD AUTO: 1.3 [, K/UL] (ref 1–4.3)
LYMPHOCYTES NFR BLD AUTO: 8.4 [, %] (ref 20–40)
MCH RBC QN AUTO: 29.9 [, PG] (ref 27–31)
MCHC RBC AUTO-ENTMCNC: 32.9 [, G/DL] (ref 33–37)
MCV RBC AUTO: 90.9 [, FL] (ref 81–99)
METAMYELOCYTES NFR BLD: 1 [, %] (ref 0–0)
MONOCYTE: 3 [, %] (ref 0–10)
MONOCYTES # BLD: 0.3 [, K/UL] (ref 0–0.8)
MONOCYTES NFR BLD: 2.2 [, %] (ref 0–10)
MYELOCYTES NFR BLD: 4 [, %] (ref 0–0)
NEUTROPHILS # BLD: 13.7 [, K/UL] (ref 1.8–7)
NEUTROPHILS NFR BLD AUTO: 80 [, %] (ref 50–75)
NEUTROPHILS NFR BLD AUTO: 86.7 [, %] (ref 50–75)
NEUTS BAND NFR BLD: 4 [, %] (ref 0–2)
NRBC BLD AUTO-RTO: 3 [, %] (ref 0–2)
NRBC BLD AUTO-RTO: 4 [, %] (ref 0–0)
PLATELET # BLD EST: NORMAL [,]
PLATELET # BLD: 366 [, K/UL] (ref 130–400)
PMV BLD AUTO: 8.3 [, FL] (ref 7.2–11.7)
RBC # BLD AUTO: 3.22 [, MIL/UL] (ref 3.8–5.2)
TOTAL CELLS COUNTED BLD: 100 [,]
WBC # BLD AUTO: 15.8 [, K/UL] (ref 4.8–10.8)

## 2018-07-04 PROCEDURE — 5A1D70Z PERFORMANCE OF URINARY FILTRATION, INTERMITTENT, LESS THAN 6 HOURS PER DAY: ICD-10-PCS

## 2018-07-04 RX ADMIN — SEVELAMER CARBONATE SCH: 800 POWDER, FOR SUSPENSION ORAL at 12:37

## 2018-07-04 RX ADMIN — ERYTHROPOIETIN SCH UNIT: 10000 INJECTION, SOLUTION INTRAVENOUS; SUBCUTANEOUS at 18:27

## 2018-07-04 RX ADMIN — ALBUMIN (HUMAN) SCH GM: 12.5 INJECTION, SOLUTION INTRAVENOUS at 19:49

## 2018-07-04 RX ADMIN — HUMAN INSULIN SCH: 100 INJECTION, SOLUTION SUBCUTANEOUS at 12:36

## 2018-07-04 RX ADMIN — Medication SCH: at 09:48

## 2018-07-04 RX ADMIN — HUMAN INSULIN SCH U: 100 INJECTION, SOLUTION SUBCUTANEOUS at 00:15

## 2018-07-04 RX ADMIN — Medication SCH: at 09:49

## 2018-07-04 RX ADMIN — Medication SCH CAP: at 09:38

## 2018-07-04 RX ADMIN — VANCOMYCIN HYDROCHLORIDE SCH MLS/HR: 1 INJECTION, POWDER, LYOPHILIZED, FOR SOLUTION INTRAVENOUS at 10:52

## 2018-07-04 RX ADMIN — SEVELAMER CARBONATE SCH GM: 800 POWDER, FOR SUSPENSION ORAL at 17:03

## 2018-07-04 RX ADMIN — Medication SCH CAP: at 17:03

## 2018-07-04 RX ADMIN — Medication SCH TAB: at 09:39

## 2018-07-04 RX ADMIN — SEVELAMER CARBONATE SCH GM: 800 POWDER, FOR SUSPENSION ORAL at 07:39

## 2018-07-04 RX ADMIN — HUMAN INSULIN SCH U: 100 INJECTION, SOLUTION SUBCUTANEOUS at 06:19

## 2018-07-05 LAB
BASOPHILS # BLD AUTO: 0.1 [, K/UL] (ref 0–0.2)
BASOPHILS NFR BLD: 0.7 [, %] (ref 0–2)
BUN SERPL-MCNC: 50 [, MG/DL] (ref 7–17)
CALCIUM SERPL-MCNC: 8.3 [, MG/DL] (ref 8.6–10.4)
EOSINOPHIL # BLD AUTO: 0.1 [, K/UL] (ref 0–0.7)
EOSINOPHIL NFR BLD: 0.7 [, %] (ref 0–4)
ERYTHROCYTE [DISTWIDTH] IN BLOOD BY AUTOMATED COUNT: 17.7 [, %] (ref 11.5–14.5)
GFR NON-AFRICAN AMERICAN: 13 [,]
HGB BLD-MCNC: 9.7 [, G/DL] (ref 11–16)
LYMPHOCYTES # BLD AUTO: 1.3 [, K/UL] (ref 1–4.3)
LYMPHOCYTES NFR BLD AUTO: 13.9 [, %] (ref 20–40)
MCH RBC QN AUTO: 30.5 [, PG] (ref 27–31)
MCHC RBC AUTO-ENTMCNC: 32.4 [, G/DL] (ref 33–37)
MCV RBC AUTO: 94.1 [, FL] (ref 81–99)
MONOCYTES # BLD: 0.5 [, K/UL] (ref 0–0.8)
MONOCYTES NFR BLD: 5.4 [, %] (ref 0–10)
NEUTROPHILS # BLD: 7.2 [, K/UL] (ref 1.8–7)
NEUTROPHILS NFR BLD AUTO: 79.3 [, %] (ref 50–75)
NRBC BLD AUTO-RTO: 3 [, %] (ref 0–2)
PLATELET # BLD: 293 [, K/UL] (ref 130–400)
PMV BLD AUTO: 8.2 [, FL] (ref 7.2–11.7)
RBC # BLD AUTO: 3.17 [, MIL/UL] (ref 3.8–5.2)
WBC # BLD AUTO: 9.1 [, K/UL] (ref 4.8–10.8)

## 2018-07-05 RX ADMIN — HUMAN INSULIN SCH: 100 INJECTION, SOLUTION SUBCUTANEOUS at 07:31

## 2018-07-05 RX ADMIN — EMTRICITABINE AND TENOFOVIR DISOPROXIL FUMARATE SCH TAB: 200; 300 TABLET, FILM COATED ORAL at 13:36

## 2018-07-05 RX ADMIN — HUMAN INSULIN SCH: 100 INJECTION, SOLUTION SUBCUTANEOUS at 17:34

## 2018-07-05 RX ADMIN — SEVELAMER CARBONATE SCH: 800 POWDER, FOR SUSPENSION ORAL at 08:19

## 2018-07-05 RX ADMIN — Medication SCH: at 10:46

## 2018-07-05 RX ADMIN — Medication SCH: at 10:45

## 2018-07-05 RX ADMIN — SEVELAMER CARBONATE SCH GM: 800 POWDER, FOR SUSPENSION ORAL at 17:32

## 2018-07-05 RX ADMIN — SEVELAMER CARBONATE SCH GM: 800 POWDER, FOR SUSPENSION ORAL at 12:20

## 2018-07-05 RX ADMIN — HUMAN INSULIN SCH: 100 INJECTION, SOLUTION SUBCUTANEOUS at 12:01

## 2018-07-05 RX ADMIN — Medication SCH CAP: at 10:24

## 2018-07-05 RX ADMIN — Medication SCH TAB: at 10:23

## 2018-07-05 RX ADMIN — Medication SCH CAP: at 17:33

## 2018-07-05 RX ADMIN — HUMAN INSULIN SCH: 100 INJECTION, SOLUTION SUBCUTANEOUS at 21:42

## 2018-07-06 PROCEDURE — 5A1D70Z PERFORMANCE OF URINARY FILTRATION, INTERMITTENT, LESS THAN 6 HOURS PER DAY: ICD-10-PCS

## 2018-07-06 PROCEDURE — 02PY33Z REMOVAL OF INFUSION DEVICE FROM GREAT VESSEL, PERCUTANEOUS APPROACH: ICD-10-PCS | Performed by: SURGERY

## 2018-07-06 PROCEDURE — 02HV33Z INSERTION OF INFUSION DEVICE INTO SUPERIOR VENA CAVA, PERCUTANEOUS APPROACH: ICD-10-PCS | Performed by: SURGERY

## 2018-07-06 RX ADMIN — DEXTROSE MONOHYDRATE STA ML: 500 INJECTION PARENTERAL at 16:25

## 2018-07-06 RX ADMIN — ALBUMIN (HUMAN) SCH GM: 12.5 INJECTION, SOLUTION INTRAVENOUS at 19:55

## 2018-07-06 RX ADMIN — POTASSIUM CHLORIDE SCH: 20 TABLET, EXTENDED RELEASE ORAL at 09:06

## 2018-07-06 RX ADMIN — HUMAN INSULIN SCH: 100 INJECTION, SOLUTION SUBCUTANEOUS at 07:25

## 2018-07-06 RX ADMIN — VANCOMYCIN HYDROCHLORIDE SCH: 1 INJECTION, POWDER, LYOPHILIZED, FOR SOLUTION INTRAVENOUS at 09:07

## 2018-07-06 RX ADMIN — SEVELAMER CARBONATE SCH: 800 POWDER, FOR SUSPENSION ORAL at 17:25

## 2018-07-06 RX ADMIN — HUMAN INSULIN SCH: 100 INJECTION, SOLUTION SUBCUTANEOUS at 22:25

## 2018-07-06 RX ADMIN — SEVELAMER CARBONATE SCH: 800 POWDER, FOR SUSPENSION ORAL at 07:26

## 2018-07-06 RX ADMIN — ERYTHROPOIETIN SCH UNIT: 10000 INJECTION, SOLUTION INTRAVENOUS; SUBCUTANEOUS at 18:00

## 2018-07-06 RX ADMIN — Medication SCH: at 17:25

## 2018-07-06 RX ADMIN — DEXTROSE MONOHYDRATE STA ML: 500 INJECTION PARENTERAL at 16:50

## 2018-07-06 RX ADMIN — VANCOMYCIN HYDROCHLORIDE SCH MLS/HR: 1 INJECTION, POWDER, LYOPHILIZED, FOR SOLUTION INTRAVENOUS at 09:34

## 2018-07-06 RX ADMIN — HUMAN INSULIN SCH: 100 INJECTION, SOLUTION SUBCUTANEOUS at 11:48

## 2018-07-06 RX ADMIN — Medication SCH: at 09:07

## 2018-07-06 RX ADMIN — Medication SCH: at 09:05

## 2018-07-06 RX ADMIN — SEVELAMER CARBONATE SCH: 800 POWDER, FOR SUSPENSION ORAL at 12:00

## 2018-07-06 RX ADMIN — HUMAN INSULIN SCH: 100 INJECTION, SOLUTION SUBCUTANEOUS at 17:25

## 2018-07-06 RX ADMIN — ALBUMIN (HUMAN) SCH GM: 12.5 INJECTION, SOLUTION INTRAVENOUS at 16:45

## 2018-07-07 RX ADMIN — HUMAN INSULIN SCH: 100 INJECTION, SOLUTION SUBCUTANEOUS at 08:26

## 2018-07-07 RX ADMIN — Medication SCH TAB: at 10:33

## 2018-07-07 RX ADMIN — HUMAN INSULIN SCH: 100 INJECTION, SOLUTION SUBCUTANEOUS at 12:37

## 2018-07-07 RX ADMIN — Medication SCH CAP: at 17:26

## 2018-07-07 RX ADMIN — HUMAN INSULIN SCH: 100 INJECTION, SOLUTION SUBCUTANEOUS at 17:10

## 2018-07-07 RX ADMIN — SEVELAMER CARBONATE SCH GM: 800 POWDER, FOR SUSPENSION ORAL at 08:30

## 2018-07-07 RX ADMIN — EMTRICITABINE AND TENOFOVIR DISOPROXIL FUMARATE SCH TAB: 200; 300 TABLET, FILM COATED ORAL at 14:35

## 2018-07-07 RX ADMIN — POTASSIUM CHLORIDE SCH MEQ: 20 TABLET, EXTENDED RELEASE ORAL at 10:33

## 2018-07-07 RX ADMIN — SEVELAMER CARBONATE SCH GM: 800 POWDER, FOR SUSPENSION ORAL at 17:26

## 2018-07-07 RX ADMIN — HUMAN INSULIN SCH: 100 INJECTION, SOLUTION SUBCUTANEOUS at 21:57

## 2018-07-07 RX ADMIN — Medication SCH CAP: at 10:34

## 2018-07-08 LAB
BUN SERPL-MCNC: 40 [, MG/DL] (ref 7–17)
CALCIUM SERPL-MCNC: 7.8 [, MG/DL] (ref 8.6–10.4)
GFR NON-AFRICAN AMERICAN: 12 [,]

## 2018-07-08 RX ADMIN — Medication SCH: at 09:01

## 2018-07-08 RX ADMIN — HUMAN INSULIN SCH UNIT: 100 INJECTION, SOLUTION SUBCUTANEOUS at 17:10

## 2018-07-08 RX ADMIN — SEVELAMER CARBONATE SCH GM: 800 POWDER, FOR SUSPENSION ORAL at 08:53

## 2018-07-08 RX ADMIN — SEVELAMER CARBONATE SCH GM: 800 POWDER, FOR SUSPENSION ORAL at 11:40

## 2018-07-08 RX ADMIN — HUMAN INSULIN SCH: 100 INJECTION, SOLUTION SUBCUTANEOUS at 08:11

## 2018-07-08 RX ADMIN — POTASSIUM CHLORIDE SCH: 20 TABLET, EXTENDED RELEASE ORAL at 09:02

## 2018-07-08 RX ADMIN — SEVELAMER CARBONATE SCH GM: 800 POWDER, FOR SUSPENSION ORAL at 17:52

## 2018-07-08 RX ADMIN — Medication SCH CAP: at 17:53

## 2018-07-08 RX ADMIN — Medication SCH CAP: at 09:00

## 2018-07-08 RX ADMIN — HUMAN INSULIN SCH: 100 INJECTION, SOLUTION SUBCUTANEOUS at 11:40

## 2018-07-08 RX ADMIN — HUMAN INSULIN SCH: 100 INJECTION, SOLUTION SUBCUTANEOUS at 21:59

## 2018-07-09 VITALS
SYSTOLIC BLOOD PRESSURE: 94 MMHG | TEMPERATURE: 98.3 F | HEART RATE: 93 BPM | RESPIRATION RATE: 18 BRPM | DIASTOLIC BLOOD PRESSURE: 54 MMHG

## 2018-07-09 VITALS — OXYGEN SATURATION: 97 %

## 2018-07-09 PROCEDURE — 5A1D70Z PERFORMANCE OF URINARY FILTRATION, INTERMITTENT, LESS THAN 6 HOURS PER DAY: ICD-10-PCS

## 2018-07-09 RX ADMIN — EMTRICITABINE AND TENOFOVIR DISOPROXIL FUMARATE SCH: 200; 300 TABLET, FILM COATED ORAL at 14:45

## 2018-07-09 RX ADMIN — HUMAN INSULIN SCH: 100 INJECTION, SOLUTION SUBCUTANEOUS at 11:38

## 2018-07-09 RX ADMIN — SEVELAMER CARBONATE SCH: 800 POWDER, FOR SUSPENSION ORAL at 11:39

## 2018-07-09 RX ADMIN — Medication SCH CAP: at 17:43

## 2018-07-09 RX ADMIN — HUMAN INSULIN SCH: 100 INJECTION, SOLUTION SUBCUTANEOUS at 16:30

## 2018-07-09 RX ADMIN — Medication SCH TAB: at 09:00

## 2018-07-09 RX ADMIN — Medication SCH CAP: at 09:00

## 2018-07-09 RX ADMIN — SEVELAMER CARBONATE SCH GM: 800 POWDER, FOR SUSPENSION ORAL at 09:00

## 2018-07-09 RX ADMIN — HUMAN INSULIN SCH: 100 INJECTION, SOLUTION SUBCUTANEOUS at 07:44

## 2018-07-09 RX ADMIN — SEVELAMER CARBONATE SCH GM: 800 POWDER, FOR SUSPENSION ORAL at 17:43

## 2018-07-09 RX ADMIN — ALBUMIN (HUMAN) SCH GM: 12.5 INJECTION, SOLUTION INTRAVENOUS at 15:20

## 2018-07-09 RX ADMIN — VANCOMYCIN HYDROCHLORIDE SCH: 1 INJECTION, POWDER, LYOPHILIZED, FOR SOLUTION INTRAVENOUS at 11:39

## 2018-07-09 RX ADMIN — POTASSIUM CHLORIDE SCH MEQ: 20 TABLET, EXTENDED RELEASE ORAL at 09:00

## 2018-07-09 RX ADMIN — EMTRICITABINE AND TENOFOVIR DISOPROXIL FUMARATE SCH TAB: 200; 300 TABLET, FILM COATED ORAL at 09:07

## 2018-07-09 RX ADMIN — ERYTHROPOIETIN SCH UNIT: 10000 INJECTION, SOLUTION INTRAVENOUS; SUBCUTANEOUS at 15:23

## 2018-08-11 ENCOUNTER — HOSPITAL ENCOUNTER (EMERGENCY)
Dept: HOSPITAL 31 - C.ER | Age: 58
Discharge: HOME | End: 2018-08-11
Payer: MEDICARE

## 2018-08-11 VITALS — BODY MASS INDEX: 29.9 KG/M2

## 2018-08-11 VITALS
RESPIRATION RATE: 18 BRPM | HEART RATE: 78 BPM | SYSTOLIC BLOOD PRESSURE: 134 MMHG | DIASTOLIC BLOOD PRESSURE: 78 MMHG | TEMPERATURE: 98.4 F | OXYGEN SATURATION: 97 %

## 2018-08-11 DIAGNOSIS — R10.9: Primary | ICD-10-CM

## 2018-08-11 DIAGNOSIS — I12.0: ICD-10-CM

## 2018-08-11 DIAGNOSIS — Z87.891: ICD-10-CM

## 2018-08-11 DIAGNOSIS — N18.6: ICD-10-CM

## 2018-08-11 LAB
ALBUMIN SERPL-MCNC: 3.9 G/DL (ref 3.5–5)
ALBUMIN/GLOB SERPL: 1.1 {RATIO} (ref 1–2.1)
ALT SERPL-CCNC: 30 U/L (ref 9–52)
AST SERPL-CCNC: 42 U/L (ref 14–36)
BASOPHILS # BLD AUTO: 0 K/UL (ref 0–0.2)
BASOPHILS NFR BLD: 0.6 % (ref 0–2)
BUN SERPL-MCNC: 13 MG/DL (ref 7–17)
CALCIUM SERPL-MCNC: 9.6 MG/DL (ref 8.6–10.4)
EOSINOPHIL # BLD AUTO: 0.1 K/UL (ref 0–0.7)
EOSINOPHIL NFR BLD: 1.3 % (ref 0–4)
ERYTHROCYTE [DISTWIDTH] IN BLOOD BY AUTOMATED COUNT: 21.4 % (ref 11.5–14.5)
GFR NON-AFRICAN AMERICAN: 22
HGB BLD-MCNC: 12.6 G/DL (ref 11–16)
LIPASE: 18 U/L (ref 23–300)
LYMPHOCYTES # BLD AUTO: 1 K/UL (ref 1–4.3)
LYMPHOCYTES NFR BLD AUTO: 20.4 % (ref 20–40)
MCH RBC QN AUTO: 30.3 PG (ref 27–31)
MCHC RBC AUTO-ENTMCNC: 31.4 G/DL (ref 33–37)
MCV RBC AUTO: 96.5 FL (ref 81–99)
MONOCYTES # BLD: 0.5 K/UL (ref 0–0.8)
MONOCYTES NFR BLD: 9 % (ref 0–10)
NEUTROPHILS # BLD: 3.4 K/UL (ref 1.8–7)
NEUTROPHILS NFR BLD AUTO: 68.7 % (ref 50–75)
NRBC BLD AUTO-RTO: 0.1 % (ref 0–2)
PLATELET # BLD: 196 K/UL (ref 130–400)
PMV BLD AUTO: 8.1 FL (ref 7.2–11.7)
RBC # BLD AUTO: 4.17 MIL/UL (ref 3.8–5.2)
WBC # BLD AUTO: 5 K/UL (ref 4.8–10.8)

## 2018-08-11 PROCEDURE — 83690 ASSAY OF LIPASE: CPT

## 2018-08-11 PROCEDURE — 74176 CT ABD & PELVIS W/O CONTRAST: CPT

## 2018-08-11 PROCEDURE — 96374 THER/PROPH/DIAG INJ IV PUSH: CPT

## 2018-08-11 PROCEDURE — 85025 COMPLETE CBC W/AUTO DIFF WBC: CPT

## 2018-08-11 PROCEDURE — 96375 TX/PRO/DX INJ NEW DRUG ADDON: CPT

## 2018-08-11 PROCEDURE — 99285 EMERGENCY DEPT VISIT HI MDM: CPT

## 2018-08-11 PROCEDURE — 80053 COMPREHEN METABOLIC PANEL: CPT

## 2018-09-27 ENCOUNTER — HOSPITAL ENCOUNTER (EMERGENCY)
Dept: HOSPITAL 31 - C.ER | Age: 58
Discharge: HOME | End: 2018-09-27
Payer: MEDICARE

## 2018-09-27 VITALS — OXYGEN SATURATION: 97 %

## 2018-09-27 VITALS
TEMPERATURE: 98.6 F | DIASTOLIC BLOOD PRESSURE: 78 MMHG | RESPIRATION RATE: 14 BRPM | HEART RATE: 70 BPM | SYSTOLIC BLOOD PRESSURE: 123 MMHG

## 2018-09-27 VITALS — BODY MASS INDEX: 29.9 KG/M2

## 2018-09-27 DIAGNOSIS — R10.9: ICD-10-CM

## 2018-09-27 DIAGNOSIS — K59.00: Primary | ICD-10-CM

## 2018-09-27 LAB
ALBUMIN SERPL-MCNC: 4.3 G/DL (ref 3.5–5)
ALBUMIN/GLOB SERPL: 1.1 {RATIO} (ref 1–2.1)
ALT SERPL-CCNC: 24 U/L (ref 9–52)
APTT BLD: 28 SECONDS (ref 21–34)
AST SERPL-CCNC: 26 U/L (ref 14–36)
BASOPHILS # BLD AUTO: 0 K/UL (ref 0–0.2)
BASOPHILS NFR BLD: 0.7 % (ref 0–2)
BUN SERPL-MCNC: 17 MG/DL (ref 7–17)
CALCIUM SERPL-MCNC: 9.5 MG/DL (ref 8.6–10.4)
EOSINOPHIL # BLD AUTO: 0.1 K/UL (ref 0–0.7)
EOSINOPHIL NFR BLD: 1.8 % (ref 0–4)
ERYTHROCYTE [DISTWIDTH] IN BLOOD BY AUTOMATED COUNT: 21.3 % (ref 11.5–14.5)
GFR NON-AFRICAN AMERICAN: 21
HGB BLD-MCNC: 12.1 G/DL (ref 11–16)
INR PPP: 1
LIPASE: 71 U/L (ref 23–300)
LYMPHOCYTES # BLD AUTO: 1.2 K/UL (ref 1–4.3)
LYMPHOCYTES NFR BLD AUTO: 35.4 % (ref 20–40)
MCH RBC QN AUTO: 30 PG (ref 27–31)
MCHC RBC AUTO-ENTMCNC: 32.9 G/DL (ref 33–37)
MCV RBC AUTO: 91.2 FL (ref 81–99)
MONOCYTES # BLD: 0.2 K/UL (ref 0–0.8)
MONOCYTES NFR BLD: 5.7 % (ref 0–10)
NEUTROPHILS # BLD: 2 K/UL (ref 1.8–7)
NEUTROPHILS NFR BLD AUTO: 56.4 % (ref 50–75)
NRBC BLD AUTO-RTO: 0.1 % (ref 0–2)
PLATELET # BLD: 162 K/UL (ref 130–400)
PMV BLD AUTO: 8.3 FL (ref 7.2–11.7)
PROTHROMBIN TIME: 10.7 SECONDS (ref 9.7–12.2)
RBC # BLD AUTO: 4.04 MIL/UL (ref 3.8–5.2)
WBC # BLD AUTO: 3.5 K/UL (ref 4.8–10.8)

## 2018-12-17 ENCOUNTER — HOSPITAL ENCOUNTER (INPATIENT)
Dept: HOSPITAL 31 - C.ER | Age: 58
LOS: 3 days | Discharge: HOME | DRG: 314 | End: 2018-12-20
Attending: INTERNAL MEDICINE | Admitting: INTERNAL MEDICINE
Payer: MEDICARE

## 2018-12-17 VITALS — BODY MASS INDEX: 29.9 KG/M2
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
## 2018-12-17 DIAGNOSIS — Z89.511: ICD-10-CM

## 2018-12-17 DIAGNOSIS — E83.39: ICD-10-CM

## 2018-12-17 DIAGNOSIS — E11.51: ICD-10-CM

## 2018-12-17 DIAGNOSIS — Z79.4: ICD-10-CM

## 2018-12-17 DIAGNOSIS — B20: ICD-10-CM

## 2018-12-17 DIAGNOSIS — Z91.11: ICD-10-CM

## 2018-12-17 DIAGNOSIS — Z89.612: ICD-10-CM

## 2018-12-17 DIAGNOSIS — N18.6: ICD-10-CM

## 2018-12-17 DIAGNOSIS — D64.9: ICD-10-CM

## 2018-12-17 DIAGNOSIS — F03.90: ICD-10-CM

## 2018-12-17 DIAGNOSIS — N25.81: ICD-10-CM

## 2018-12-17 DIAGNOSIS — I50.9: ICD-10-CM

## 2018-12-17 DIAGNOSIS — E21.1: ICD-10-CM

## 2018-12-17 DIAGNOSIS — I82.220: ICD-10-CM

## 2018-12-17 DIAGNOSIS — E78.5: ICD-10-CM

## 2018-12-17 DIAGNOSIS — E11.65: ICD-10-CM

## 2018-12-17 DIAGNOSIS — E03.9: ICD-10-CM

## 2018-12-17 DIAGNOSIS — Z87.891: ICD-10-CM

## 2018-12-17 DIAGNOSIS — H40.9: ICD-10-CM

## 2018-12-17 DIAGNOSIS — T82.41XA: Primary | ICD-10-CM

## 2018-12-17 DIAGNOSIS — Z79.899: ICD-10-CM

## 2018-12-17 DIAGNOSIS — Z91.14: ICD-10-CM

## 2018-12-17 DIAGNOSIS — E11.22: ICD-10-CM

## 2018-12-17 DIAGNOSIS — E11.649: ICD-10-CM

## 2018-12-17 DIAGNOSIS — Z99.2: ICD-10-CM

## 2018-12-17 DIAGNOSIS — I13.2: ICD-10-CM

## 2018-12-17 DIAGNOSIS — E11.21: ICD-10-CM

## 2018-12-17 DIAGNOSIS — Z90.49: ICD-10-CM

## 2018-12-17 LAB
ALBUMIN SERPL-MCNC: 3.9 G/DL (ref 3.5–5)
ALBUMIN/GLOB SERPL: 1.1 {RATIO} (ref 1–2.1)
ALT SERPL-CCNC: 20 U/L (ref 9–52)
APTT BLD: 36 SECONDS (ref 21–34)
ARTERIAL BLOOD GAS O2 SAT: 66.3 % (ref 95–98)
ARTERIAL BLOOD GAS PCO2: 50 MM/HG (ref 35–45)
ARTERIAL BLOOD GAS TCO2: 23.4 MMOL/L (ref 22–28)
AST SERPL-CCNC: 22 U/L (ref 14–36)
BASOPHILS # BLD AUTO: 0 K/UL (ref 0–0.2)
BASOPHILS NFR BLD: 1 % (ref 0–2)
BUN SERPL-MCNC: 59 MG/DL (ref 7–17)
CALCIUM SERPL-MCNC: 8.9 MG/DL (ref 8.6–10.4)
EOSINOPHIL # BLD AUTO: 0.1 K/UL (ref 0–0.7)
EOSINOPHIL NFR BLD: 2.7 % (ref 0–4)
ERYTHROCYTE [DISTWIDTH] IN BLOOD BY AUTOMATED COUNT: 17.1 % (ref 11.5–14.5)
GFR NON-AFRICAN AMERICAN: 6
HCO3 BLDA-SCNC: 19.5 MMOL/L (ref 21–28)
HGB BLD-MCNC: 12.1 G/DL (ref 11–16)
INR PPP: 1
LIPASE: 82 U/L (ref 23–300)
LYMPHOCYTES # BLD AUTO: 1 K/UL (ref 1–4.3)
LYMPHOCYTES NFR BLD AUTO: 23.9 % (ref 20–40)
MCH RBC QN AUTO: 33.2 PG (ref 27–31)
MCHC RBC AUTO-ENTMCNC: 32.9 G/DL (ref 33–37)
MCV RBC AUTO: 100.9 FL (ref 81–99)
MONOCYTES # BLD: 0.2 K/UL (ref 0–0.8)
MONOCYTES NFR BLD: 6.1 % (ref 0–10)
NEUTROPHILS # BLD: 2.7 K/UL (ref 1.8–7)
NEUTROPHILS NFR BLD AUTO: 66.3 % (ref 50–75)
NRBC BLD AUTO-RTO: 0 % (ref 0–2)
PH BLDA: 7.25 [PH] (ref 7.35–7.45)
PLATELET # BLD: 139 K/UL (ref 130–400)
PMV BLD AUTO: 8.6 FL (ref 7.2–11.7)
PO2 BLDA: 37 MM/HG (ref 80–100)
PROTHROMBIN TIME: 10.8 SECONDS (ref 9.7–12.2)
RBC # BLD AUTO: 3.65 MIL/UL (ref 3.8–5.2)
TROPONIN I SERPL-MCNC: 0.02 NG/ML (ref 0–0.12)
WBC # BLD AUTO: 4 K/UL (ref 4.8–10.8)

## 2018-12-18 LAB
ALBUMIN SERPL-MCNC: 3.9 G/DL (ref 3.5–5)
ALBUMIN/GLOB SERPL: 1 {RATIO} (ref 1–2.1)
ALT SERPL-CCNC: 18 U/L (ref 9–52)
APTT BLD: 41 SECONDS (ref 21–34)
AST SERPL-CCNC: 24 U/L (ref 14–36)
BASOPHILS # BLD AUTO: 0 K/UL (ref 0–0.2)
BASOPHILS NFR BLD: 1 % (ref 0–2)
BUN SERPL-MCNC: 66 MG/DL (ref 7–17)
CALCIUM SERPL-MCNC: 8.7 MG/DL (ref 8.6–10.4)
EOSINOPHIL # BLD AUTO: 0.1 K/UL (ref 0–0.7)
EOSINOPHIL NFR BLD: 3.6 % (ref 0–4)
ERYTHROCYTE [DISTWIDTH] IN BLOOD BY AUTOMATED COUNT: 17.4 % (ref 11.5–14.5)
GFR NON-AFRICAN AMERICAN: 6
HGB BLD-MCNC: 12.3 G/DL (ref 11–16)
INR PPP: 1
LYMPHOCYTES # BLD AUTO: 1 K/UL (ref 1–4.3)
LYMPHOCYTES NFR BLD AUTO: 36.2 % (ref 20–40)
MCH RBC QN AUTO: 33.4 PG (ref 27–31)
MCHC RBC AUTO-ENTMCNC: 32.8 G/DL (ref 33–37)
MCV RBC AUTO: 101.8 FL (ref 81–99)
MONOCYTES # BLD: 0.1 K/UL (ref 0–0.8)
MONOCYTES NFR BLD: 4.2 % (ref 0–10)
NEUTROPHILS # BLD: 1.5 K/UL (ref 1.8–7)
NEUTROPHILS NFR BLD AUTO: 55 % (ref 50–75)
NRBC BLD AUTO-RTO: 0 % (ref 0–2)
PLATELET # BLD: 151 K/UL (ref 130–400)
PMV BLD AUTO: 9.1 FL (ref 7.2–11.7)
PROTHROMBIN TIME: 10.6 SECONDS (ref 9.7–12.2)
RBC # BLD AUTO: 3.68 MIL/UL (ref 3.8–5.2)
WBC # BLD AUTO: 2.7 K/UL (ref 4.8–10.8)

## 2018-12-18 PROCEDURE — B516ZZZ FLUOROSCOPY OF RIGHT SUBCLAVIAN VEIN: ICD-10-PCS | Performed by: SURGERY

## 2018-12-18 PROCEDURE — B518ZZZ FLUOROSCOPY OF SUPERIOR VENA CAVA: ICD-10-PCS | Performed by: SURGERY

## 2018-12-18 PROCEDURE — B517ZZZ FLUOROSCOPY OF LEFT SUBCLAVIAN VEIN: ICD-10-PCS | Performed by: SURGERY

## 2018-12-18 RX ADMIN — EMTRICITABINE AND TENOFOVIR DISOPROXIL FUMARATE SCH: 200; 300 TABLET, FILM COATED ORAL at 12:30

## 2018-12-18 RX ADMIN — Medication SCH TAB: at 10:42

## 2018-12-18 RX ADMIN — HUMAN INSULIN SCH: 100 INJECTION, SOLUTION SUBCUTANEOUS at 17:00

## 2018-12-18 RX ADMIN — HUMAN INSULIN SCH: 100 INJECTION, SOLUTION SUBCUTANEOUS at 11:35

## 2018-12-18 RX ADMIN — HUMAN INSULIN SCH: 100 INJECTION, SOLUTION SUBCUTANEOUS at 21:57

## 2018-12-18 RX ADMIN — HUMAN INSULIN SCH: 100 INJECTION, SOLUTION SUBCUTANEOUS at 07:55

## 2018-12-19 VITALS — RESPIRATION RATE: 20 BRPM

## 2018-12-19 LAB
ALBUMIN SERPL-MCNC: 3.8 G/DL (ref 3.5–5)
ALBUMIN/GLOB SERPL: 1.1 {RATIO} (ref 1–2.1)
ALT SERPL-CCNC: 14 U/L (ref 9–52)
AST SERPL-CCNC: 32 U/L (ref 14–36)
BUN SERPL-MCNC: 71 MG/DL (ref 7–17)
CALCIUM SERPL-MCNC: 8.2 MG/DL (ref 8.6–10.4)
ERYTHROCYTE [DISTWIDTH] IN BLOOD BY AUTOMATED COUNT: 17.4 % (ref 11.5–14.5)
GFR NON-AFRICAN AMERICAN: 5
HGB BLD-MCNC: 11 G/DL (ref 11–16)
MCH RBC QN AUTO: 33.6 PG (ref 27–31)
MCHC RBC AUTO-ENTMCNC: 33.1 G/DL (ref 33–37)
MCV RBC AUTO: 101.4 FL (ref 81–99)
PLATELET # BLD: 141 K/UL (ref 130–400)
PMV BLD AUTO: 9.1 FL (ref 7.2–11.7)
RBC # BLD AUTO: 3.29 MIL/UL (ref 3.8–5.2)
WBC # BLD AUTO: 3 K/UL (ref 4.8–10.8)

## 2018-12-19 PROCEDURE — BF45ZZZ ULTRASONOGRAPHY OF LIVER: ICD-10-PCS | Performed by: RADIOLOGY

## 2018-12-19 PROCEDURE — B51TZZA FLUOROSCOPY OF PORTAL AND SPLANCHNIC VEINS, GUIDANCE: ICD-10-PCS | Performed by: RADIOLOGY

## 2018-12-19 PROCEDURE — 06H433Z INSERTION OF INFUSION DEVICE INTO HEPATIC VEIN, PERCUTANEOUS APPROACH: ICD-10-PCS | Performed by: RADIOLOGY

## 2018-12-19 PROCEDURE — B518ZZA FLUOROSCOPY OF SUPERIOR VENA CAVA, GUIDANCE: ICD-10-PCS | Performed by: RADIOLOGY

## 2018-12-19 PROCEDURE — 0JH83XZ INSERTION OF TUNNELED VASCULAR ACCESS DEVICE INTO ABDOMEN SUBCUTANEOUS TISSUE AND FASCIA, PERCUTANEOUS APPROACH: ICD-10-PCS | Performed by: RADIOLOGY

## 2018-12-19 PROCEDURE — 5A1D70Z PERFORMANCE OF URINARY FILTRATION, INTERMITTENT, LESS THAN 6 HOURS PER DAY: ICD-10-PCS

## 2018-12-19 PROCEDURE — 02HV33Z INSERTION OF INFUSION DEVICE INTO SUPERIOR VENA CAVA, PERCUTANEOUS APPROACH: ICD-10-PCS | Performed by: RADIOLOGY

## 2018-12-19 RX ADMIN — HUMAN INSULIN SCH: 100 INJECTION, SOLUTION SUBCUTANEOUS at 11:37

## 2018-12-19 RX ADMIN — HUMAN INSULIN SCH: 100 INJECTION, SOLUTION SUBCUTANEOUS at 16:30

## 2018-12-19 RX ADMIN — HUMAN INSULIN SCH: 100 INJECTION, SOLUTION SUBCUTANEOUS at 11:36

## 2018-12-19 RX ADMIN — HUMAN INSULIN SCH: 100 INJECTION, SOLUTION SUBCUTANEOUS at 21:18

## 2018-12-19 RX ADMIN — HYDROMORPHONE HYDROCHLORIDE PRN MG: 1 INJECTION, SOLUTION INTRAMUSCULAR; INTRAVENOUS; SUBCUTANEOUS at 15:13

## 2018-12-19 RX ADMIN — HUMAN INSULIN SCH: 100 INJECTION, SOLUTION SUBCUTANEOUS at 11:38

## 2018-12-19 RX ADMIN — Medication SCH: at 11:36

## 2018-12-19 RX ADMIN — HYDROMORPHONE HYDROCHLORIDE PRN MG: 1 INJECTION, SOLUTION INTRAMUSCULAR; INTRAVENOUS; SUBCUTANEOUS at 15:30

## 2018-12-20 VITALS — SYSTOLIC BLOOD PRESSURE: 135 MMHG | DIASTOLIC BLOOD PRESSURE: 72 MMHG | OXYGEN SATURATION: 96 % | HEART RATE: 88 BPM

## 2018-12-20 VITALS — TEMPERATURE: 99.6 F

## 2018-12-20 RX ADMIN — HUMAN INSULIN SCH UNIT: 100 INJECTION, SOLUTION SUBCUTANEOUS at 12:40

## 2018-12-20 RX ADMIN — Medication SCH TAB: at 09:25

## 2018-12-20 RX ADMIN — HUMAN INSULIN SCH: 100 INJECTION, SOLUTION SUBCUTANEOUS at 08:06

## 2018-12-20 RX ADMIN — EMTRICITABINE AND TENOFOVIR DISOPROXIL FUMARATE SCH TAB: 200; 300 TABLET, FILM COATED ORAL at 12:39

## 2018-12-20 RX ADMIN — HUMAN INSULIN SCH: 100 INJECTION, SOLUTION SUBCUTANEOUS at 16:53

## 2019-02-02 ENCOUNTER — HOSPITAL ENCOUNTER (EMERGENCY)
Dept: HOSPITAL 42 - ED | Age: 59
Discharge: LEFT BEFORE DISCHARGE | End: 2019-02-02
Payer: MEDICARE

## 2019-02-02 VITALS — RESPIRATION RATE: 18 BRPM | TEMPERATURE: 98.1 F

## 2019-02-02 VITALS — OXYGEN SATURATION: 96 % | HEART RATE: 68 BPM

## 2019-02-02 VITALS — BODY MASS INDEX: 40.5 KG/M2

## 2019-02-02 VITALS — DIASTOLIC BLOOD PRESSURE: 58 MMHG | SYSTOLIC BLOOD PRESSURE: 131 MMHG

## 2019-02-02 DIAGNOSIS — I12.0: ICD-10-CM

## 2019-02-02 DIAGNOSIS — N18.6: ICD-10-CM

## 2019-02-02 DIAGNOSIS — K59.00: Primary | ICD-10-CM

## 2019-02-02 DIAGNOSIS — E78.5: ICD-10-CM

## 2019-02-02 DIAGNOSIS — Z99.2: ICD-10-CM

## 2019-02-02 DIAGNOSIS — E11.9: ICD-10-CM

## 2019-02-02 DIAGNOSIS — I50.9: ICD-10-CM

## 2019-02-02 NOTE — RAD
Date of service: 



02/02/2019



HISTORY:

 abdominal pain 



COMPARISON:

None available.



FINDINGS:



BOWEL:

No evidence of acute mechanical bowel obstruction.  No gross free 

intraperitoneal air 



BONES:

Normal.



OTHER FINDINGS:

Percutaneous catheter seen right upper quadrant of the abdomen..  

Apparent right-sided effusion and right basilar atelectasis 



Cholecystectomy clips 



IMPRESSION:

No evidence of acute mechanical bowel obstruction.  See above 

discussion for additional details and findings

## 2019-02-02 NOTE — ED PDOC
Arrival/HPI





- General


Chief Complaint: Abdominal Pain


Time Seen by Provider: 02/02/19 13:50


Historian: Patient





- History of Present Illness


Narrative History of Present Illness (Text): 





02/02/19 14:28


58 year old female, with past medical history of CHF, hypertension, dementia, 

anxiety, cholecystectomy, hypothyroidism, and hyperthyroidism, presents to 

emergency department complaining of abdominal pain and constipation for the past

two days. Patient notes receiving dialysis yesterday. Patient describes 

abdominal pain as "cramping" and reports taking laxative last night with no 

significant improvement. Patient denies any fevers, chills, headache, dizziness,

chest pain, shortness of breath, cough, nausea, vomiting, diarrhea, back pain, 

neck pain, or any other complaints.





Time/Duration: Other (2 days)


Symptom Onset: Gradual


Symptom Course: Unchanged


Activities at Onset: Light


Context: Home





Past Medical History





- Provider Review


Nursing Documentation Reviewed: Yes





- Infectious Disease


Hx of Infectious Diseases: None





- Cardiac


Hx Congestive Heart Failure: Yes


Hx Hypertension: Yes





- Pulmonary


Hx Respiratory Disorders: No





- Neurological


Hx Dementia: Yes





- HEENT


Hx HEENT Disorder: Yes (wears glasses)


Hx Glaucoma: Yes





- Renal


Hx Renal Disorder: Yes


Hx Kidney Stones: No





- Endocrine/Metabolic


Hx Hyperthyroidism: Yes


Hx Hypothyroidism: Yes





- Hematological/Oncological


Hx Blood Disorders: No





- Integumentary


Hx Dermatological Disorder: No





- Musculoskeletal/Rheumatological


Hx Falls: Yes





- Gastrointestinal


Hx Gastrointestinal Disorders: No





- Genitourinary/Gynecological


Hx Genitourinary Disorders: Yes


Other/Comment: oliguria, on hemodialysis MWF





- Psychiatric


Hx Anxiety: Yes


Hx Substance Use: No





- Surgical History


Hx Cholecystectomy: Yes





- Anesthesia


Hx Anesthesia: Yes


Hx Anesthesia Reactions: No


Hx Malignant Hyperthermia: No





- Suicidal Assessment


Feels Threatened In Home Enviroment: No





Family/Social History





- Physician Review


Nursing Documentation Reviewed: Yes


Family/Social History: Unknown Family HX


Smoking Status: Never Smoked


Hx Alcohol Use: No


Hx Substance Use: No





Allergies/Home Meds


Allergies/Adverse Reactions: 


Allergies





No Known Allergies Allergy (Verified 02/02/19 14:09)


   








Home Medications: 


                                    Home Meds











 Medication  Instructions  Recorded  Confirmed


 


RX: Folic Acid/Vit B Complex and C 0.8 mg PO DAILY 05/10/18 06/27/18





[Nephro-Dee Tablet]   


 


RX: HYDROmorphone [Dilaudid] 2 mg PO Q4 PRN 06/16/18 06/27/18


 


RX: Sevelamer Carbonate [Renvela] 800 mg PO TID 06/16/18 06/27/18


 


RX: Simethicone [Mylicon Chew Tab] 80 mg PO QID 06/16/18 06/27/18


 


RX: Prochlorperazine [Compazine] 25 mg RC BID PRN 06/27/18 06/27/18














Review of Systems





- Physician Review


All systems were reviewed & negative as marked: Yes





- Review of Systems


Constitutional: absent: Fevers


Respiratory: absent: SOB, Cough, Wheezing


Cardiovascular: absent: Chest Pain


Gastrointestinal: Abdominal Pain (cramping, 2 days ), Constipation (2 days).  

absent: Diarrhea, Nausea, Vomiting


Musculoskeletal: absent: Back Pain, Neck Pain


Skin: absent: Rash


Neurological: absent: Headache, Dizziness





Physical Exam


Vital Signs Reviewed: Yes





Vital Signs











  Temp Pulse Resp BP Pulse Ox


 


 02/02/19 13:44  98.1 F  67  18  116/57 L  100











Temperature: Afebrile


Blood Pressure: Normal


Pulse: Regular


Respiratory Rate: Normal


Appearance: Positive for: Well-Appearing, Non-Toxic, Comfortable


Pain Distress: None


Mental Status: Positive for: Alert and Oriented X 3





- Systems Exam


Head: Present: Atraumatic, Normocephalic


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


Extroacular Muscles: Present: EOMI


Conjunctiva: Present: Normal


Mouth: Present: Moist Mucous Membranes


Neck: Present: Normal Range of Motion


Respiratory/Chest: Present: Clear to Auscultation, Good Air Exchange.  No: 

Respiratory Distress, Accessory Muscle Use, Wheezes, Rales, Rhonchi


Cardiovascular: Present: Regular Rate and Rhythm, Normal S1, S2.  No: Murmurs


Abdomen: Present: Distention.  No: Tenderness, Peritoneal Signs


Back: Present: Normal Inspection


Upper Extremity: Present: Normal Inspection.  No: Cyanosis, Edema


Lower Extremity: Present: Other (aka left, bka right, no tenderness to either 

stump).  No: Edema, Erythema (no erythema to either stump )


Neurological: Present: GCS=15, Speech Normal


Skin: Present: Warm, Dry, Normal Color.  No: Rashes


Psychiatric: Present: Alert, Oriented x 3, Normal Insight, Normal Concentration





Medical Decision Making


ED Course and Treatment: 





02/02/19 14:53


Impression:


58 year old female presents to emergency department complaining of abdominal 

pain and constipation for the past 2 days. 





Differential Diagnosis included but are not limited to:  


-- SBO


--Constipation 





Plan:


--Labs


--Percocet


--CT a/p w/o contrast


-- Fleet mineral oil enema 


-- X-ray abdomen


-- Reassess and disposition





Prior Visits:


Notes and results from previous visits were reviewed. 





Progress Notes:


02/02/19 14:50


Son called and spoke to nurse, stating that patient had a bowel movement this 

morning that was normal in caliber. KUB pending





02/02/19 15:28


Reevaluated patient who is in excruciating pain. Will obtain labs as well as CT 

a/p.


Patient admantly refusing to have lab work obtained. She wishes to continue oral

 analgesics as well as attempt to have a bowel movement.





02/02/19 18:12


Surgery resident evaluated patient who states patient does not need any acute 

surgical intervention at this time. Patient refuses to have blood work performed

 or IV placed. 





02/02/19 19:17


Patient interrogated on whether she will agree to labs being drawn with her 

refusing. She wishes to sign out AMA. Risks vs benefits of leaving prior to 

completion of medical evaluation given to patient who still refuses to stay. AMA

 papers signed. Ambulance transport called.











- RAD Interpretation


Narrative RAD Interpretations (Text): 





02/02/19 15:27


CT abdomen/pelvis:


IMPRESSION:


Small to medium size right-sided effusion and atelectasis.  Multiple 

calcifications seen scattered throughout the attic ectatic lung consistent with 

prior exposure to granulomatous disease process. 


Small pericardial effusion.  


There is a percutaneous catheter extending through the right anterolateral 

abdominal wall into the hepatic parenchyma and presumably through a hepatic vein

 terminating in the IVC just caudal to the right atrium. Clinical correlation 

recommended.


Large amount of stool within the distended rectum consistent with fecal 

retention pattern. 


Cholecystectomy. 


Atrophic kidneys likely due to chronic renal failure however clinical 

correlation recommended.  Small cyst left kidney as described. 


See above discussion for additional details and findings.





02/02/19 14:15


Abdomen x-ray:


FINDINGS:


BOWEL:


No evidence of acute mechanical bowel obstruction.  No gross free 

intraperitoneal air 


BONES:


Normal.


OTHER FINDINGS:


Percutaneous catheter seen right upper quadrant of the abdomen..  Apparent 

right-sided effusion and right basilar atelectasis 


Cholecystectomy clips 


IMPRESSION:


No evidence of acute mechanical bowel obstruction.  See above discussion for 

additional details and findings





Radiology Orders: 











02/02/19 14:15


ABDOMEN (FLAT PLATE) 1VIEW [RAD] Stat 











: Radiologist





- Medication Orders


Current Medication Orders: 














Discontinued Medications





Magnesium Citrate (Citrate Of Mag)  300 ml PO ONCE ONE


   Stop: 02/02/19 14:16


Mineral Oil (Fleet Mineral Oil Enema)  135 ml RC ONCE ONE


   Stop: 02/02/19 14:16











- Scribe Statement


The provider has reviewed the documentation as recorded by the Scribe





Greg Galvez





All medical record entries made by the Scribe were at my direction and 

personally dictated by me. I have reviewed the chart and agree that the record 

accurately reflects my personal performance of the history, physical exam, 

medical decision making, and the department course for this patient. I have also

 personally directed, reviewed, and agree with the discharge instructions and 

disposition.








Disposition/Present on Arrival





- Present on Arrival


Any Indicators Present on Arrival: Yes


History of DVT/PE: No


History of Uncontrolled Diabetes: Yes


Urinary Catheter: No


History of Decub. Ulcer: No


History Surgical Site Infection Following: None





- Disposition


Have Diagnosis and Disposition been Completed?: Yes


Diagnosis: 


 Constipation





Disposition: AGAINST MEDICAL ADVICE


Disposition Time: 19:23


Condition: STABLE


Discharge Instructions (ExitCare):  Constipation in Adults, Fecal Impaction, 

High Fiber Diet


Prescriptions: 


Docusate Sodium [Colace] 100 mg PO DAILY #10 capsule


Metoclopramide [Reglan] 5 mg PO QID #20 tab


Mineral Oil [Fleet Mineral Oil Enema 135 Ml] 135 ml RC PRN PRN #3 nma


 PRN Reason: Constipation


Forms:  [facility] (English)

## 2019-02-02 NOTE — CP.PCM.CON
History of Present Illness





- History of Present Illness


History of Present Illness: 





Surgery 





HPI: This 57yo F with PMHx of recent Transhepatic hemodialysis catheter 

placement,  L AKA 2/2 Dry [place], HLD, HTN, HIV (undetectable), ESRD on 

dialysis MWF, DM (on insulin), PAD - is brought to the emergency department for 

abd pain. Pain is on the mid abdominal area and dull. Pain started after HD 

yeterday. Reports nausea no vomiting. Per pt she was constipated for a few days.

Per ED, family said she had a BM today before coming to ED.  She is on dialysis 

MWF. Reports that she has had similar sxs after HD in the past. Denies fever, 

SOB, AMS, CP.  


CT showed no SBO. Stool Impacted in rectum. 








PMHx: uncontrolled DM, HTN, HLD, HIV, ESRD on HD [facility]


Allergies: None 


Surgeries: L AKA, Right BKA amputation, multiple placements for vascular access 

for dialysis, transhepatic HD catheter in 12/2018


Medications: Isentress 400mg BID, Starlix 60mg daily, metroprolol 25mg BID, 

procrit 10k IV MWF, isorbide mononitrate 60mg PO daily, Lopinavir/Ritonavir 200-

50 PO BID, Toprol 25mg BID, Iron pills 500mg PO TID, Vit B complex, Folic Acid 


Social: Lives with son, does not walkb/l amputation, former smoker





Review of Systems





- Review of Systems


Review of Systems: 





see HPI





Past Patient History





- Infectious Disease


Hx of Infectious Diseases: None





- Past Medical History & Family History


Past Medical History?: Yes





- Past Social History


Smoking Status: Never Smoked





- CARDIAC


Hx Congestive Heart Failure: Yes


Hx Hypertension: Yes





- PULMONARY


Hx Respiratory Disorders: No





- NEUROLOGICAL


Hx Dementia: Yes





- HEENT


Hx HEENT Problems: Yes (wears glasses)


Hx Glaucoma: Yes





- RENAL


Hx Chronic Kidney Disease: Yes


Hx Kidney Stones: No





- ENDOCRINE/METABOLIC


Hx Hyperthyroidism: Yes


Hx Hypothyroidism: Yes





- HEMATOLOGICAL/ONCOLOGICAL


Hx Blood Disorders: No





- INTEGUMENTARY


Hx Dermatological Problems: No





- MUSCULOSKELETAL/RHEUMATOLOGICAL


Hx Falls: Yes





- GASTROINTESTINAL


Hx Gastrointestinal Disorders: No





- GENITOURINARY/GYNECOLOGICAL


Hx Genitourinary Disorders: Yes


Other/Comment: oliguria, on hemodialysis MWF





- PSYCHIATRIC


Hx Anxiety: Yes


Hx Substance Use: No





- SURGICAL HISTORY


Hx Cholecystectomy: Yes





- ANESTHESIA


Hx Anesthesia: Yes


Hx Anesthesia Reactions: No


Hx Malignant Hyperthermia: No





Meds


Allergies/Adverse Reactions: 


                                    Allergies











Allergy/AdvReac Type Severity Reaction Status Date / Time


 


No Known Allergies Allergy   Verified 02/02/19 14:09














Physical Exam





- Constitutional


Appears: No Acute Distress





- Head Exam


Head Exam: ATRAUMATIC, NORMAL INSPECTION, NORMOCEPHALIC





- Eye Exam


Eye Exam: EOMI, PERRL


Pupil Exam: NORMAL ACCOMODATION, PERRL





- ENT Exam


ENT Exam: Mucous Membranes Moist





- Neck Exam


Neck exam: Positive for: Normal Inspection





- Respiratory Exam


Respiratory Exam: NORMAL BREATHING PATTERN





- Cardiovascular Exam


Cardiovascular Exam: REGULAR RHYTHM





- GI/Abdominal Exam


GI & Abdominal Exam: Soft, Tenderness.  absent: Distended, Firm, Guarding, He

rnia, Rigid


Additional comments: 





Epigastric ttp





-  Exam


 Exam: NORMAL INSPECTION





- Extremities Exam


Extremities exam: Negative for: full ROM


Additional comments: 





B/L AMPUTATION





- Back Exam


Back exam: NORMAL INSPECTION





- Neurological Exam


Neurological exam: Alert, CN II-XII Intact, Oriented x3, Reflexes Normal





- Psychiatric Exam


Psychiatric exam: Normal Affect, Normal Mood





- Skin


Skin Exam: Dry, Intact, Normal Color, Warm





Results





- Vital Signs


Recent Vital Signs: 


                                Last Vital Signs











Temp  98.1 F   02/02/19 13:44


 


Pulse  65   02/02/19 15:44


 


Resp  18   02/02/19 15:44


 


BP  127/56 L  02/02/19 15:44


 


Pulse Ox  98   02/02/19 15:44














Assessment & Plan





- Assessment and Plan (Free Text)


Assessment: 





Abd pain 2/2 diabetic gastroparesis , constipation 





-Eryhtromycin 100mg IV 


-Reglan 5mg QID


-Stool softner 


-No surgical intervention

## 2019-02-02 NOTE — CT
Date of service: 



02/02/2019



PROCEDURE:  CT Abdomen and Pelvis.



HISTORY:

Abdominal pain with suspicion of obstruction on KUB 



COMPARISON:

Massive prior CT scan abdomen pelvis 04/19/2018. 



TECHNIQUE:

Contiguous axial images of the abdomen and pelvis performed without 

oral or intravenous contrast material.  Additional 2D sagittal and 

coronal reformats generated. 



Radiation dose:



Total exam DLP = 1027.27 mGy-cm.



This CT exam was performed using one or more of the following dose 

reduction techniques: Automated exposure control, adjustment of the 

mA and/or kV according to patient size, and/or use of iterative 

reconstruction technique.



FINDINGS:



LOWER THORAX:

Heart size is within range of normal.  Small pericardial effusion.



Moderate-sized right-sided effusion and mild right atelectasis.  

There are several tiny calcifications also seen in the atelectatic 

right lung possibly related to prior exposure to granulomatous 

disease process.  Clinical correlation recommended. 



Minor atelectasis and/or scarring seen in both lung bases including 

the lingular and middle lobe regions. 



LIVER:

There is a percutaneous catheter which seen extending into the right 

upper anterolateral abdominal wall through the hepatic parenchyma and 

apparently into the hepatic IVC with tip apparently at the level of 

the IVC at the junction of the right just caudal to the right atrium. 

 Clinical correlation recommended.  Rule out cirrhosis. 



There are few tiny calcifications inferior aspect right lobe liver. 



GALLBLADDER AND BILE DUCTS:

Cholecystectomy. 



PANCREAS:

Pancreas is atrophic and fatty replaced. 



SPLEEN:

Spleen exhibits normal size and attenuation pattern.  No splenic mass 

collection or calcification. 



ADRENALS:

Unremarkable. 



KIDNEYS AND URETERS:

Kidneys are atrophic with cortical volume loss.  Vascular 

calcifications are present.  Small cyst anterolateral upper- midpole 

left kidney. 



BLADDER:

Urinary bladder incompletely distended which in part accounts for 

slight thick-walled appearance.  Correlation with urinalysis 

recommended. 



REPRODUCTIVE:

Unremarkable.  



APPENDIX:

No evidence of acute appendicitis



BOWEL:

Unremarkable. No obstruction. No gross mural thickening.  Moderately 

large amount of stool is present within the of rectum which is 

somewhat dilated.  Findings consistent with mild fecal impaction. 



PERITONEUM:

Unremarkable. No fluid collection. No free air.



Note also made of increased vascularity within the subcutaneous 

tissues of the mid and lower anterior abdominal wall.  Rule out 

cirrhosis. 



LYMPH NODES:

Unremarkable. No enlarged lymph nodes. 



VASCULATURE:

Unremarkable. No aortic aneurysm. Mild o aortic atherosclerotic 

calcification or mural plaque present.



BONES:

Multilevel degenerative spondylosis of the lower thoracic and lumbar 

spine.  Multilevel chronic endplate deformity/Schmorl's node changes 

are again seen. 



OTHER FINDINGS:

Note made of infiltration changes within the subcutaneous tissues of 

the proximal left thigh this patient who is apparent AKA. 



IMPRESSION:

Small to medium size right-sided effusion and atelectasis.  Multiple 

calcifications seen scattered throughout the attic ectatic lung 

consistent with prior exposure to granulomatous disease process. 



Small pericardial effusion.  



There is a percutaneous catheter extending through the right 

anterolateral abdominal wall into the hepatic parenchyma and 

presumably through a hepatic vein terminating in the IVC just caudal 

to the right atrium.  Clinical correlation recommended.



Large amount of stool within the distended rectum consistent with 

fecal retention pattern. 



Cholecystectomy. 



Atrophic kidneys likely due to chronic renal failure however clinical 

correlation recommended.  Small cyst left kidney as described. 



See above discussion for additional details and findings.

## 2019-04-16 ENCOUNTER — HOSPITAL ENCOUNTER (INPATIENT)
Dept: HOSPITAL 31 - C.ER | Age: 59
LOS: 6 days | Discharge: NURSING, REHAB OR LONG-TERM CARE | DRG: 562 | End: 2019-04-22
Payer: MEDICARE

## 2019-04-16 VITALS — BODY MASS INDEX: 40.5 KG/M2

## 2019-04-16 DIAGNOSIS — F41.9: ICD-10-CM
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
## 2019-04-16 DIAGNOSIS — Z79.4: ICD-10-CM

## 2019-04-16 DIAGNOSIS — F03.90: ICD-10-CM

## 2019-04-16 DIAGNOSIS — E11.51: ICD-10-CM

## 2019-04-16 DIAGNOSIS — E11.65: ICD-10-CM

## 2019-04-16 DIAGNOSIS — Z21: ICD-10-CM

## 2019-04-16 DIAGNOSIS — E11.22: ICD-10-CM

## 2019-04-16 DIAGNOSIS — I50.9: ICD-10-CM

## 2019-04-16 DIAGNOSIS — N25.81: ICD-10-CM

## 2019-04-16 DIAGNOSIS — Z99.2: ICD-10-CM

## 2019-04-16 DIAGNOSIS — Z87.891: ICD-10-CM

## 2019-04-16 DIAGNOSIS — D63.1: ICD-10-CM

## 2019-04-16 DIAGNOSIS — N18.6: ICD-10-CM

## 2019-04-16 DIAGNOSIS — S42.291A: Primary | ICD-10-CM

## 2019-04-16 DIAGNOSIS — E03.9: ICD-10-CM

## 2019-04-16 DIAGNOSIS — Z90.49: ICD-10-CM

## 2019-04-16 DIAGNOSIS — J98.11: ICD-10-CM

## 2019-04-16 DIAGNOSIS — I13.2: ICD-10-CM

## 2019-04-16 LAB
BASOPHILS # BLD AUTO: 0 K/UL (ref 0–0.2)
BASOPHILS NFR BLD: 0.7 % (ref 0–2)
EOSINOPHIL # BLD AUTO: 0 K/UL (ref 0–0.7)
EOSINOPHIL NFR BLD: 0.4 % (ref 0–4)
ERYTHROCYTE [DISTWIDTH] IN BLOOD BY AUTOMATED COUNT: 20 % (ref 11.5–14.5)
HGB BLD-MCNC: 15.7 G/DL (ref 11–16)
LYMPHOCYTES # BLD AUTO: 0.9 K/UL (ref 1–4.3)
LYMPHOCYTES NFR BLD AUTO: 15.2 % (ref 20–40)
MCH RBC QN AUTO: 30.7 PG (ref 27–31)
MCHC RBC AUTO-ENTMCNC: 32 G/DL (ref 33–37)
MCV RBC AUTO: 95.9 FL (ref 81–99)
MONOCYTES # BLD: 0.3 K/UL (ref 0–0.8)
MONOCYTES NFR BLD: 4.7 % (ref 0–10)
NEUTROPHILS # BLD: 4.9 K/UL (ref 1.8–7)
NEUTROPHILS NFR BLD AUTO: 79 % (ref 50–75)
NRBC BLD AUTO-RTO: 0.1 % (ref 0–2)
PLATELET # BLD: 153 K/UL (ref 130–400)
PMV BLD AUTO: 8.6 FL (ref 7.2–11.7)
RBC # BLD AUTO: 5.13 MIL/UL (ref 3.8–5.2)
WBC # BLD AUTO: 6.2 K/UL (ref 4.8–10.8)

## 2019-04-17 LAB
ALBUMIN SERPL-MCNC: 3.7 G/DL (ref 3.5–5)
ALBUMIN/GLOB SERPL: 1 {RATIO} (ref 1–2.1)
ALT SERPL-CCNC: < 6 U/L (ref 9–52)
AST SERPL-CCNC: 16 U/L (ref 14–36)
BUN SERPL-MCNC: 43 MG/DL (ref 7–17)
CALCIUM SERPL-MCNC: 7.8 MG/DL (ref 8.6–10.4)
GFR NON-AFRICAN AMERICAN: 6
INR PPP: 1.1
PROTHROMBIN TIME: 11.6 SECONDS (ref 9.7–12.2)

## 2019-04-17 RX ADMIN — HUMAN INSULIN SCH: 100 INJECTION, SOLUTION SUBCUTANEOUS at 11:52

## 2019-04-17 RX ADMIN — HUMAN INSULIN SCH: 100 INJECTION, SOLUTION SUBCUTANEOUS at 22:12

## 2019-04-17 RX ADMIN — EMTRICITABINE AND TENOFOVIR DISOPROXIL FUMARATE SCH TAB: 200; 300 TABLET, FILM COATED ORAL at 11:18

## 2019-04-17 RX ADMIN — HUMAN INSULIN SCH: 100 INJECTION, SOLUTION SUBCUTANEOUS at 17:30

## 2019-04-17 RX ADMIN — SODIUM CHLORIDE SCH UNITS: 9 INJECTION, SOLUTION INTRAMUSCULAR; INTRAVENOUS; SUBCUTANEOUS at 17:47

## 2019-04-18 LAB
% IRON SATURATION: 22 (ref 20–55)
BUN SERPL-MCNC: 30 MG/DL (ref 7–17)
CALCIUM SERPL-MCNC: 8.4 MG/DL (ref 8.6–10.4)
ERYTHROCYTE [DISTWIDTH] IN BLOOD BY AUTOMATED COUNT: 19.2 % (ref 11.5–14.5)
FOLATE SERPL-MCNC: 10.5 NG/ML
GFR NON-AFRICAN AMERICAN: 9
HDLC SERPL-MCNC: 46 MG/DL (ref 30–70)
HGB BLD-MCNC: 13.7 G/DL (ref 11–16)
IRON SERPL-MCNC: 41 UG/DL (ref 37–170)
LDLC SERPL-MCNC: 61 MG/DL (ref 0–129)
MCH RBC QN AUTO: 30.8 PG (ref 27–31)
MCHC RBC AUTO-ENTMCNC: 31.9 G/DL (ref 33–37)
MCV RBC AUTO: 96.6 FL (ref 81–99)
PLATELET # BLD: 133 K/UL (ref 130–400)
PMV BLD AUTO: 9.1 FL (ref 7.2–11.7)
RBC # BLD AUTO: 4.46 MIL/UL (ref 3.8–5.2)
TIBC SERPL-MCNC: 187 UG/DL (ref 250–450)
VIT B12 SERPL-MCNC: 436 PG/ML (ref 239–931)
WBC # BLD AUTO: 3.3 K/UL (ref 4.8–10.8)

## 2019-04-18 RX ADMIN — HUMAN INSULIN SCH: 100 INJECTION, SOLUTION SUBCUTANEOUS at 21:45

## 2019-04-18 RX ADMIN — HUMAN INSULIN SCH: 100 INJECTION, SOLUTION SUBCUTANEOUS at 16:39

## 2019-04-18 RX ADMIN — HUMAN INSULIN SCH: 100 INJECTION, SOLUTION SUBCUTANEOUS at 12:09

## 2019-04-18 RX ADMIN — HUMAN INSULIN SCH: 100 INJECTION, SOLUTION SUBCUTANEOUS at 07:46

## 2019-04-19 PROCEDURE — 5A1D70Z PERFORMANCE OF URINARY FILTRATION, INTERMITTENT, LESS THAN 6 HOURS PER DAY: ICD-10-PCS

## 2019-04-19 RX ADMIN — HUMAN INSULIN SCH: 100 INJECTION, SOLUTION SUBCUTANEOUS at 11:37

## 2019-04-19 RX ADMIN — HUMAN INSULIN SCH: 100 INJECTION, SOLUTION SUBCUTANEOUS at 22:13

## 2019-04-19 RX ADMIN — HUMAN INSULIN SCH: 100 INJECTION, SOLUTION SUBCUTANEOUS at 07:55

## 2019-04-19 RX ADMIN — EMTRICITABINE AND TENOFOVIR DISOPROXIL FUMARATE SCH TAB: 200; 300 TABLET, FILM COATED ORAL at 13:34

## 2019-04-19 RX ADMIN — HUMAN INSULIN SCH UNITS: 100 INJECTION, SOLUTION SUBCUTANEOUS at 17:13

## 2019-04-20 LAB
ALBUMIN SERPL-MCNC: 3.6 G/DL (ref 3.5–5)
ALBUMIN/GLOB SERPL: 1 {RATIO} (ref 1–2.1)
ALT SERPL-CCNC: < 6 U/L (ref 9–52)
AST SERPL-CCNC: 15 U/L (ref 14–36)
BASOPHILS # BLD AUTO: 0 K/UL (ref 0–0.2)
BASOPHILS NFR BLD: 0.6 % (ref 0–2)
BUN SERPL-MCNC: 27 MG/DL (ref 7–17)
CALCIUM SERPL-MCNC: 8.6 MG/DL (ref 8.6–10.4)
EOSINOPHIL # BLD AUTO: 0.1 K/UL (ref 0–0.7)
EOSINOPHIL NFR BLD: 2.1 % (ref 0–4)
ERYTHROCYTE [DISTWIDTH] IN BLOOD BY AUTOMATED COUNT: 19 % (ref 11.5–14.5)
GFR NON-AFRICAN AMERICAN: 9
HGB BLD-MCNC: 11.4 G/DL (ref 11–16)
LYMPHOCYTES # BLD AUTO: 0.7 K/UL (ref 1–4.3)
LYMPHOCYTES NFR BLD AUTO: 19.1 % (ref 20–40)
MCH RBC QN AUTO: 29.5 PG (ref 27–31)
MCHC RBC AUTO-ENTMCNC: 31.4 G/DL (ref 33–37)
MCV RBC AUTO: 93.9 FL (ref 81–99)
MONOCYTES # BLD: 0.4 K/UL (ref 0–0.8)
MONOCYTES NFR BLD: 10.1 % (ref 0–10)
NEUTROPHILS # BLD: 2.4 K/UL (ref 1.8–7)
NEUTROPHILS NFR BLD AUTO: 68.1 % (ref 50–75)
NRBC BLD AUTO-RTO: 0.2 % (ref 0–2)
PLATELET # BLD: 144 K/UL (ref 130–400)
PMV BLD AUTO: 8.7 FL (ref 7.2–11.7)
RBC # BLD AUTO: 3.86 MIL/UL (ref 3.8–5.2)
WBC # BLD AUTO: 3.5 K/UL (ref 4.8–10.8)

## 2019-04-20 RX ADMIN — HUMAN INSULIN SCH UNITS: 100 INJECTION, SOLUTION SUBCUTANEOUS at 17:55

## 2019-04-20 RX ADMIN — HUMAN INSULIN SCH: 100 INJECTION, SOLUTION SUBCUTANEOUS at 07:58

## 2019-04-20 RX ADMIN — HUMAN INSULIN SCH: 100 INJECTION, SOLUTION SUBCUTANEOUS at 21:24

## 2019-04-20 RX ADMIN — HUMAN INSULIN SCH: 100 INJECTION, SOLUTION SUBCUTANEOUS at 11:48

## 2019-04-21 LAB
ALBUMIN SERPL-MCNC: 3.8 G/DL (ref 3.5–5)
ALBUMIN/GLOB SERPL: 1.1 {RATIO} (ref 1–2.1)
ALT SERPL-CCNC: < 6 U/L (ref 9–52)
AST SERPL-CCNC: 20 U/L (ref 14–36)
BASOPHILS # BLD AUTO: 0 K/UL (ref 0–0.2)
BASOPHILS NFR BLD: 1 % (ref 0–2)
BUN SERPL-MCNC: 49 MG/DL (ref 7–17)
CALCIUM SERPL-MCNC: 8.3 MG/DL (ref 8.6–10.4)
EOSINOPHIL # BLD AUTO: 0.1 K/UL (ref 0–0.7)
EOSINOPHIL NFR BLD: 3.3 % (ref 0–4)
ERYTHROCYTE [DISTWIDTH] IN BLOOD BY AUTOMATED COUNT: 19.1 % (ref 11.5–14.5)
GFR NON-AFRICAN AMERICAN: 6
HGB BLD-MCNC: 11.7 G/DL (ref 11–16)
LYMPHOCYTES # BLD AUTO: 1 K/UL (ref 1–4.3)
LYMPHOCYTES NFR BLD AUTO: 25.7 % (ref 20–40)
MCH RBC QN AUTO: 30.5 PG (ref 27–31)
MCHC RBC AUTO-ENTMCNC: 32.3 G/DL (ref 33–37)
MCV RBC AUTO: 94.3 FL (ref 81–99)
MONOCYTES # BLD: 0.5 K/UL (ref 0–0.8)
MONOCYTES NFR BLD: 12.2 % (ref 0–10)
NEUTROPHILS # BLD: 2.2 K/UL (ref 1.8–7)
NEUTROPHILS NFR BLD AUTO: 57.8 % (ref 50–75)
NRBC BLD AUTO-RTO: 0.1 % (ref 0–2)
PLATELET # BLD: 193 K/UL (ref 130–400)
PMV BLD AUTO: 8.9 FL (ref 7.2–11.7)
RBC # BLD AUTO: 3.83 MIL/UL (ref 3.8–5.2)
WBC # BLD AUTO: 3.8 K/UL (ref 4.8–10.8)

## 2019-04-21 RX ADMIN — Medication SCH: at 09:00

## 2019-04-21 RX ADMIN — HUMAN INSULIN SCH: 100 INJECTION, SOLUTION SUBCUTANEOUS at 21:24

## 2019-04-21 RX ADMIN — EMTRICITABINE AND TENOFOVIR DISOPROXIL FUMARATE SCH: 200; 300 TABLET, FILM COATED ORAL at 09:48

## 2019-04-21 RX ADMIN — HUMAN INSULIN SCH: 100 INJECTION, SOLUTION SUBCUTANEOUS at 07:32

## 2019-04-21 RX ADMIN — HUMAN INSULIN SCH: 100 INJECTION, SOLUTION SUBCUTANEOUS at 11:30

## 2019-04-21 RX ADMIN — HUMAN INSULIN SCH UNITS: 100 INJECTION, SOLUTION SUBCUTANEOUS at 17:02

## 2019-04-22 VITALS — TEMPERATURE: 97.4 F | OXYGEN SATURATION: 96 % | DIASTOLIC BLOOD PRESSURE: 64 MMHG | SYSTOLIC BLOOD PRESSURE: 103 MMHG

## 2019-04-22 VITALS — HEART RATE: 86 BPM | RESPIRATION RATE: 16 BRPM

## 2019-04-22 RX ADMIN — SODIUM CHLORIDE SCH UNITS: 9 INJECTION, SOLUTION INTRAMUSCULAR; INTRAVENOUS; SUBCUTANEOUS at 10:45

## 2019-04-22 RX ADMIN — SODIUM CHLORIDE SCH UNITS: 9 INJECTION, SOLUTION INTRAMUSCULAR; INTRAVENOUS; SUBCUTANEOUS at 10:44

## 2019-04-22 RX ADMIN — HUMAN INSULIN SCH: 100 INJECTION, SOLUTION SUBCUTANEOUS at 07:45

## 2019-04-22 RX ADMIN — Medication SCH TAB: at 08:04

## 2019-04-22 RX ADMIN — HUMAN INSULIN SCH: 100 INJECTION, SOLUTION SUBCUTANEOUS at 11:33

## 2019-05-09 ENCOUNTER — HOSPITAL ENCOUNTER (OUTPATIENT)
Dept: HOSPITAL 31 - C.SPRAD | Age: 59
Discharge: HOME | End: 2019-05-09
Attending: RADIOLOGY
Payer: MEDICARE

## 2019-05-09 VITALS — BODY MASS INDEX: 24.9 KG/M2

## 2019-05-09 DIAGNOSIS — N18.6: ICD-10-CM

## 2019-05-09 DIAGNOSIS — E78.5: ICD-10-CM

## 2019-05-09 DIAGNOSIS — I50.9: ICD-10-CM

## 2019-05-09 DIAGNOSIS — E11.22: ICD-10-CM

## 2019-05-09 DIAGNOSIS — I13.2: Primary | ICD-10-CM

## 2019-05-09 LAB
INR PPP: 1.1 {NULL, NULL}
PROTHROMBIN TIME: 11.5 {NULL, SECONDS} (ref 9.7–12.2)

## 2019-05-09 PROCEDURE — 36582 REPLACE TUNNELED CV CATH: CPT

## 2019-05-09 PROCEDURE — 77001 FLUOROGUIDE FOR VEIN DEVICE: CPT

## 2019-05-09 PROCEDURE — 85610 PROTHROMBIN TIME: CPT

## 2019-05-09 PROCEDURE — 82948 REAGENT STRIP/BLOOD GLUCOSE: CPT

## 2019-05-09 PROCEDURE — 36415 COLL VENOUS BLD VENIPUNCTURE: CPT
